# Patient Record
Sex: FEMALE | Race: WHITE | NOT HISPANIC OR LATINO | Employment: FULL TIME | URBAN - METROPOLITAN AREA
[De-identification: names, ages, dates, MRNs, and addresses within clinical notes are randomized per-mention and may not be internally consistent; named-entity substitution may affect disease eponyms.]

---

## 2017-06-06 ENCOUNTER — GENERIC CONVERSION - ENCOUNTER (OUTPATIENT)
Dept: OTHER | Facility: OTHER | Age: 57
End: 2017-06-06

## 2017-06-06 ENCOUNTER — ALLSCRIPTS OFFICE VISIT (OUTPATIENT)
Dept: OTHER | Facility: OTHER | Age: 57
End: 2017-06-06

## 2017-06-07 LAB
25(OH)D3 SERPL-MCNC: 17.8 NG/ML (ref 30–100)
A/G RATIO (HISTORICAL): 1.3 (ref 1.2–2.2)
ALBUMIN SERPL BCP-MCNC: 4.2 G/DL (ref 3.5–5.5)
ALP SERPL-CCNC: 74 IU/L (ref 39–117)
ALT SERPL W P-5'-P-CCNC: 12 IU/L (ref 0–32)
AST SERPL W P-5'-P-CCNC: 19 IU/L (ref 0–40)
BASOPHILS # BLD AUTO: 0.1 X10E3/UL (ref 0–0.2)
BASOPHILS # BLD AUTO: 1 %
BILIRUB SERPL-MCNC: 0.5 MG/DL (ref 0–1.2)
BUN SERPL-MCNC: 21 MG/DL (ref 6–24)
BUN/CREA RATIO (HISTORICAL): 17 (ref 9–23)
CALCIUM SERPL-MCNC: 9 MG/DL (ref 8.7–10.2)
CHLORIDE SERPL-SCNC: 100 MMOL/L (ref 96–106)
CHOLEST SERPL-MCNC: 240 MG/DL (ref 100–199)
CO2 SERPL-SCNC: 22 MMOL/L (ref 18–29)
CREAT SERPL-MCNC: 1.23 MG/DL (ref 0.57–1)
DEPRECATED RDW RBC AUTO: 14.7 % (ref 12.3–15.4)
EGFR AFRICAN AMERICAN (HISTORICAL): 56 ML/MIN/1.73
EGFR-AMERICAN CALC (HISTORICAL): 48 ML/MIN/1.73
EOSINOPHIL # BLD AUTO: 0.1 X10E3/UL (ref 0–0.4)
EOSINOPHIL # BLD AUTO: 3 %
GLUCOSE SERPL-MCNC: 95 MG/DL (ref 65–99)
HCT VFR BLD AUTO: 40.3 % (ref 34–46.6)
HDLC SERPL-MCNC: 50 MG/DL
HGB BLD-MCNC: 13.2 G/DL (ref 11.1–15.9)
IMM.GRANULOCYTES (CD4/8) (HISTORICAL): 0 %
IMM.GRANULOCYTES (CD4/8) (HISTORICAL): 0 X10E3/UL (ref 0–0.1)
LDLC SERPL CALC-MCNC: 161 MG/DL (ref 0–99)
LYMPHOCYTES # BLD AUTO: 1.1 X10E3/UL (ref 0.7–3.1)
LYMPHOCYTES # BLD AUTO: 27 %
MCH RBC QN AUTO: 29.4 PG (ref 26.6–33)
MCHC RBC AUTO-ENTMCNC: 32.8 G/DL (ref 31.5–35.7)
MCV RBC AUTO: 90 FL (ref 79–97)
MONOCYTES # BLD AUTO: 0.3 X10E3/UL (ref 0.1–0.9)
MONOCYTES (HISTORICAL): 8 %
NEUTROPHILS # BLD AUTO: 2.4 X10E3/UL (ref 1.4–7)
NEUTROPHILS # BLD AUTO: 61 %
PLATELET # BLD AUTO: 253 X10E3/UL (ref 150–379)
POTASSIUM SERPL-SCNC: 4.5 MMOL/L (ref 3.5–5.2)
RBC (HISTORICAL): 4.49 X10E6/UL (ref 3.77–5.28)
SODIUM SERPL-SCNC: 139 MMOL/L (ref 134–144)
TOT. GLOBULIN, SERUM (HISTORICAL): 3.2 G/DL (ref 1.5–4.5)
TOTAL PROTEIN (HISTORICAL): 7.4 G/DL (ref 6–8.5)
TRIGL SERPL-MCNC: 147 MG/DL (ref 0–149)
TSH SERPL DL<=0.05 MIU/L-ACNC: 2.87 UIU/ML (ref 0.45–4.5)
VLDLC SERPL CALC-MCNC: 29 MG/DL (ref 5–40)
WBC # BLD AUTO: 3.9 X10E3/UL (ref 3.4–10.8)

## 2017-06-08 LAB — HBA1C MFR BLD HPLC: 5.9 % (ref 4.8–5.6)

## 2017-06-12 ENCOUNTER — ALLSCRIPTS OFFICE VISIT (OUTPATIENT)
Dept: OTHER | Facility: OTHER | Age: 57
End: 2017-06-12

## 2017-06-12 DIAGNOSIS — Z12.39 ENCOUNTER FOR OTHER SCREENING FOR MALIGNANT NEOPLASM OF BREAST: ICD-10-CM

## 2017-12-12 ENCOUNTER — ALLSCRIPTS OFFICE VISIT (OUTPATIENT)
Dept: OTHER | Facility: OTHER | Age: 57
End: 2017-12-12

## 2017-12-13 LAB
A/G RATIO (HISTORICAL): 1.4 (ref 1.2–2.2)
ALBUMIN SERPL BCP-MCNC: 4.2 G/DL (ref 3.5–5.5)
ALP SERPL-CCNC: 71 IU/L (ref 39–117)
ALT SERPL W P-5'-P-CCNC: 10 IU/L (ref 0–32)
AST SERPL W P-5'-P-CCNC: 17 IU/L (ref 0–40)
BILIRUB SERPL-MCNC: 0.5 MG/DL (ref 0–1.2)
BUN SERPL-MCNC: 23 MG/DL (ref 6–24)
BUN/CREA RATIO (HISTORICAL): 19 (ref 9–23)
CALCIUM SERPL-MCNC: 9.2 MG/DL (ref 8.7–10.2)
CHLORIDE SERPL-SCNC: 98 MMOL/L (ref 96–106)
CHOLEST SERPL-MCNC: 226 MG/DL (ref 100–199)
CO2 SERPL-SCNC: 25 MMOL/L (ref 18–29)
CREAT SERPL-MCNC: 1.2 MG/DL (ref 0.57–1)
EGFR AFRICAN AMERICAN (HISTORICAL): 57 ML/MIN/1.73
EGFR-AMERICAN CALC (HISTORICAL): 50 ML/MIN/1.73
GLUCOSE SERPL-MCNC: 93 MG/DL (ref 65–99)
HDLC SERPL-MCNC: 49 MG/DL
LDL/HDL RATIO (HISTORICAL): 3.3 RATIO UNITS (ref 0–3.2)
LDLC SERPL CALC-MCNC: 160 MG/DL (ref 0–99)
POTASSIUM SERPL-SCNC: 4.2 MMOL/L (ref 3.5–5.2)
SODIUM SERPL-SCNC: 139 MMOL/L (ref 134–144)
TOT. GLOBULIN, SERUM (HISTORICAL): 3 G/DL (ref 1.5–4.5)
TOTAL PROTEIN (HISTORICAL): 7.2 G/DL (ref 6–8.5)
TRIGL SERPL-MCNC: 86 MG/DL (ref 0–149)
TSH SERPL DL<=0.05 MIU/L-ACNC: 2.84 UIU/ML (ref 0.45–4.5)
VLDLC SERPL CALC-MCNC: 17 MG/DL (ref 5–40)

## 2017-12-14 ENCOUNTER — GENERIC CONVERSION - ENCOUNTER (OUTPATIENT)
Dept: OTHER | Facility: OTHER | Age: 57
End: 2017-12-14

## 2017-12-14 LAB
EST. AVERAGE GLUCOSE BLD GHB EST-MCNC: 117 MG/DL
HBA1C MFR BLD HPLC: 5.7 %HB

## 2017-12-15 LAB — 25(OH)D3 SERPL-MCNC: 37 NG/ML

## 2017-12-19 ENCOUNTER — ALLSCRIPTS OFFICE VISIT (OUTPATIENT)
Dept: OTHER | Facility: OTHER | Age: 57
End: 2017-12-19

## 2017-12-20 NOTE — PROGRESS NOTES
Assessment  1  History of Benign essential hypertension (401 1) (I10)   2  History of type 2 diabetes mellitus (V12 29) (Z86 39)   3  History of familial combined hyperlipidemia (V12 29) (Z86 39)   4  Allergic rhinitis (477 9) (J30 9)   5  Hyperlipidemia (272 4) (E78 5)   6  Hypertension, unspecified type (401 9) (I10)   7  Elevated glucose (790 29) (R73 09)    Plan  Allergic rhinitis    · Fluticasone Propionate 50 MCG/ACT Nasal Suspension; USE 1 TO 2 SPRAYS INEACH NOSTRIL ONCE DAILY  PMH: Benign essential hypertension, Familial combined hyperlipidemia, PMH: History oftype 2 diabetes mellitus    · (1) COMPREHENSIVE METABOLIC PANEL; Status:Active; Requested JRB:[id number];    · (1) HEMOGLOBIN A1C; Status:Active; Requested [id number]:[id number];    · (1) LIPID PANEL FASTING W DIRECT LDL REFLEX; Status:Active; Requestedfor:01Jun2018;    · (1) TSH; Status:Active; Requested SBT:[id number]; Discussion/Summary    Hypertension stable well controlled on lisinopril hydrochlorothiazide  There is some concern that her dry cough may be ACE-inhibitor related  Patient will check with her insurance company  If it is covered we will consider switching her to losartan hydrochlorothiazide  Hyperlipidemia  Cholesterol has improved slightly but is still significantly above goal  Total cholesterol is 226 with an LDL of 160  Again recommended medication though patient declines at this time  She would like to continue to work on diet and her exercise program Reflux stable  On omeprazole patient gets good results  When she discontinues it for more than 2 days she gets return of significant reflux symptoms  Will continue at 20 mg Recheck in 6 months with labs prior to next appointment  Chief Complaint  Patient presents for a med check and to review BW results  Patient is here today for follow up of chronic conditions described in HPI        History of Present Illness  Patient returns for routine follow-up of chronic medical problems including hypertension hyperlipidemia reflux  She takes lisinopril hydrochlorothiazide for her blood pressure, omeprazole for her reflux  She is on no medication for her lipids presently  She has recently started a running program is working on her diet  She does complain of chronic postnasal drainage with a dry cough  Review of Systems   Constitutional: No fever, no chills, feels well, no tiredness, no recent weight gain or weight loss  Eyes: No complaints of eye pain, no red eyes, no eyesight problems, no discharge, no dry eyes, no itching of eyes  ENT: Postnasal drip  Cardiovascular: No complaints of slow heart rate, no fast heart rate, no chest pain, no palpitations, no leg claudication, no lower extremity edema  Respiratory: No complaints of shortness of breath, no wheezing, no cough, no SOB on exertion, no orthopnea, no PND  Gastrointestinal: No complaints of abdominal pain, no constipation, no nausea or vomiting, no diarrhea, no bloody stools  Genitourinary: No complaints of dysuria, no incontinence, no pelvic pain, no dysmenorrhea, no vaginal discharge or bleeding  Musculoskeletal: No complaints of arthralgias, no myalgias, no joint swelling or stiffness, no limb pain or swelling  Integumentary: No complaints of skin rash or lesions, no itching, no skin wounds, no breast pain or lump  Neurological: No complaints of headache, no confusion, no convulsions, no numbness, no dizziness or fainting, no tingling, no limb weakness, no difficulty walking  Psychiatric: Not suicidal, no sleep disturbance, no anxiety or depression, no change in personality, no emotional problems  Endocrine: No complaints of proptosis, no hot flashes, no muscle weakness, no deepening of the voice, no feelings of weakness  Hematologic/Lymphatic: No complaints of swollen glands, no swollen glands in the neck, does not bleed easily, does not bruise easily  Active Problems  1  Anemia (285 9) (D64 9)   2  Edema (782 3) (R60 9)   3  Esophageal reflux (530 81) (K21 9)   4  Vitamin D deficiency (268 9) (E55 9)    Past Medical History  1  History of Breast cancer screening (V76 10) (Z12 39)   2  History of Cervical cancer screening (V76 2) (Z12 4)   3  History of Colon cancer screening (V76 51) (Z12 11)   4  History of Encounter for screening colonoscopy (V76 51) (Z12 11)   5  History of Encounter for screening colonoscopy (V76 51) (Z12 11)   6  History of Encounter for screening mammogram for malignant neoplasm of breast (V76 12) (Z12 31)   7  History of acute bacterial sinusitis (V12 69) (Z87 09)   8  History of headache (V13 89) (Z87 898)   9  History of herpes simplex infection (V12 09) (Z86 19)   10  History of low back pain (V13 59) (Z87 39)   11  History of sebaceous cyst (V13 3) (Z87 2)   12  History of Joint pain (719 40) (M25 50)   13  History of Need for immunization against influenza (V04 81) (Z23)   14  History of Right knee pain (719 46) (M25 561)   15  History of Visit for screening mammogram (V76 12) (Z12 31)    The active problems and past medical history were reviewed and updated today  Family History  Mother    1  Family history of Allergic Drug Reaction  Son    2  Family history of Allergic Drug Reaction    Social History   · Being A Social Drinker   · Denied: History of Drug Use   · Never A Smoker    Current Meds   1  Calcium TABS; take 2 tablet daily; Therapy: (Recorded:38Nxv2815) to Recorded   2  Furosemide 20 MG Oral Tablet; Take 1 tablet daily; Therapy: 63WGT3982 to (Mendoza Zarco)  Requested for: 12Jun2017; Last Rx:12Jun2017 Ordered   3  Lisinopril-Hydrochlorothiazide 10-12 5 MG Oral Tablet; take 1 tablet by mouth once daily; Therapy: 83XGO6661 to (Farhan Gann)  Requested for: 96UGL2842; Last Rx:97Bmo4151 Ordered   4  Magnesium TABS; Take 1 tablet daily; Therapy: (Recorded:83Spm2670) to Recorded   5  Omeprazole 20 MG Oral Capsule Delayed Release; take 1 capsule daily;  Therapy: 08Eue7454 to (David Farooq)  Requested for: 12Jun2017; Last Rx:12Jun2017 Ordered   6  Vitamin D3 1000 UNIT Oral Capsule; take 1 capsule daily; Therapy: 74OIN4216 to Recorded    The medication list was reviewed and updated today  Allergies  1  No Known Drug Allergies    Vitals  Vital Signs    Recorded: 16CWP5798 09:26AM   Temperature 99 1 F, Tympanic   Heart Rate 87   Pulse Quality Normal   Systolic 769, LUE, Sitting   Diastolic 90, LUE, Sitting   BP CUFF SIZE Large   Height 5 ft 6 in   Weight 193 lb 3 oz   BMI Calculated 31 18   BSA Calculated 1 97   O2 Saturation 99, RA     Physical Exam   Pulmonary  Respiratory effort: No increased work of breathing or signs of respiratory distress  Auscultation of lungs: Clear to auscultation  Cardiovascular  Auscultation of heart: Normal rate and rhythm, normal S1 and S2, without murmurs     Examination of extremities for edema and/or varicosities: Normal    Musculoskeletal  Gait and station: Normal          Results/Data  General acute hospital) CMP14+eGFR 83MDH9810 09:48AM [place] Officer     Test Name Result Flag Reference   Glucose, Serum 93 mg/dL  65-99   BUN 23 mg/dL  6-24   Creatinine, Serum 1 20 mg/dL H 0 57-1 00   BUN/Creatinine Ratio 19  9-23   Sodium, Serum 139 mmol/L  134-144   Potassium, Serum 4 2 mmol/L  3 5-5 2   Chloride, Serum 98 mmol/L     Carbon Dioxide, Total 25 mmol/L  18-29   Calcium, Serum 9 2 mg/dL  8 7-10 2   Protein, Total, Serum 7 2 g/dL  6 0-8 5   Albumin, Serum 4 2 g/dL  3 5-5 5   Globulin, Total 3 0 g/dL  1 5-4 5   A/G Ratio 1 4  1 2-2 2   Bilirubin, Total 0 5 mg/dL  0 0-1 2   Alkaline Phosphatase, S 71 IU/L     AST (SGOT) 17 IU/L  0-40   ALT (SGPT) 10 IU/L  0-32   eGFR If NonAfricn Am 50 mL/min/1 73 L >59   eGFR If Africn Am 57 mL/min/1 73 L >59     (LC) Hemoglobin A1c 27Tlk4345 09:48AM [place] Officer     Test Name Result Flag Reference   Hemoglobin A1c 5 7 %Hb     Reference Range: American Diabetes Association (ADA) Guidelines: <5 7: Decreased risk for diabetes 5 7 - 6 4: Increased risk for diabetes >6 4: Ongoing Hyperglycemia of any cause <7 0: Glycemic control for adults with diabetes   Estimated Average Glucose 117 mg/dL       () Lipid Panel With LDL/HDL Ratio 12Dec2017 09:48AM Rodrigo Osorio     Test Name Result Flag Reference   Cholesterol, Total 226 mg/dL H 100-199   Triglycerides 86 mg/dL  0-149   HDL Cholesterol 49 mg/dL  >39   VLDL Cholesterol Jose 17 mg/dL  5-40   LDL Cholesterol Calc 160 mg/dL H 0-99   LDL/HDL Ratio 3 3 ratio units H 0 0-3 2   LDL/HDL Ratio                                                            Men  Women                                              1/2 Avg  Risk  1 0    1 5                                                  Avg Risk  3 6    3 2                                               2X Avg  Risk  6 2    5 0                                               3X Avg  Risk  8 0    6 1     () TSH Rfx on Abnormal to Free T4 12Dec2017 09:48AM Rodrigo Osorio     Test Name Result Flag Reference   TSH 2 840 uIU/mL  0 450-4 500     () Vitamin D, 25-Hydroxy, Total 12Dec2017 09:48AM Rodrigo Osorio     Test Name Result Flag Reference   Vitamin D, 25-Hydroxy, Serum 37 ng/mL     Reference Range: All Ages: Target levels 30 - 100     Health Management  History of Breast cancer screening   Digital Bilateral Screening Mammogram With CAD; every 1 year; Next Due: 26ROY6888; Overdue  History of Cervical cancer screening   (1) THIN PREP PAP WITH IMAGING; every 5 years; Next Due: 62LPK7321; Overdue  History of Colon cancer screening   COLONOSCOPY; every 10 years; Next Due: 64XOI9360; Overdue  History of Visit for screening mammogram   Digital Bilateral Screening Mammogram With CAD; every 1 year; Next Due: 82STO3369;  Overdue    Signatures   Electronically signed by : Aurelio De La Garza DO; Dec 19 2017 10:03AM EST                       (Author)

## 2018-01-12 VITALS
HEART RATE: 75 BPM | TEMPERATURE: 98.2 F | RESPIRATION RATE: 15 BRPM | HEIGHT: 66 IN | BODY MASS INDEX: 31.59 KG/M2 | DIASTOLIC BLOOD PRESSURE: 88 MMHG | OXYGEN SATURATION: 97 % | WEIGHT: 196.56 LBS | SYSTOLIC BLOOD PRESSURE: 128 MMHG

## 2018-01-18 NOTE — RESULT NOTES
Verified Results  (1) HEMOGLOBIN A1C 28Apr2016 04:09PM Juan Ayers   5 7-6 4% impaired fasting glucose  >=6 5% diagnosis of diabetes    Falsely low levels are seen in conditions linked to short RBC life span-  hemolytic anemia, and splenomegaly  Falsely elevated levels are seen in situations where there is an increased production of RBC- receipt of erythropoietin or blood transfusions  Adopted from ADA-Clinical Practice Recommendations     Test Name Result Flag Reference   HEMOGLOBIN A1C 5 5 %  4 0-5 6   EST  AVG   GLUCOSE 111 mg/dl

## 2018-01-23 VITALS
TEMPERATURE: 99.1 F | OXYGEN SATURATION: 99 % | BODY MASS INDEX: 31.05 KG/M2 | DIASTOLIC BLOOD PRESSURE: 90 MMHG | HEART RATE: 87 BPM | HEIGHT: 66 IN | SYSTOLIC BLOOD PRESSURE: 142 MMHG | WEIGHT: 193.19 LBS

## 2018-01-23 NOTE — RESULT NOTES
Verified Results  (LC) TSH Rfx on Abnormal to Free T4 40Iox7579 09:48AM Grady Kaur     Test Name Result Flag Reference   TSH 2 840 uIU/mL  0 450-4 500

## 2018-03-05 DIAGNOSIS — I10 ESSENTIAL HYPERTENSION: Primary | ICD-10-CM

## 2018-03-05 RX ORDER — LISINOPRIL AND HYDROCHLOROTHIAZIDE 12.5; 1 MG/1; MG/1
TABLET ORAL
Qty: 90 TABLET | Refills: 1 | Status: SHIPPED | OUTPATIENT
Start: 2018-03-05 | End: 2018-04-19 | Stop reason: SDUPTHER

## 2018-04-19 ENCOUNTER — TELEPHONE (OUTPATIENT)
Dept: FAMILY MEDICINE CLINIC | Facility: CLINIC | Age: 58
End: 2018-04-19

## 2018-04-19 DIAGNOSIS — I10 ESSENTIAL HYPERTENSION: ICD-10-CM

## 2018-04-19 RX ORDER — LISINOPRIL AND HYDROCHLOROTHIAZIDE 12.5; 1 MG/1; MG/1
1 TABLET ORAL DAILY
Qty: 90 TABLET | Refills: 0 | Status: SHIPPED | OUTPATIENT
Start: 2018-04-19 | End: 2018-06-19 | Stop reason: SDUPTHER

## 2018-06-12 ENCOUNTER — TELEPHONE (OUTPATIENT)
Dept: FAMILY MEDICINE CLINIC | Facility: CLINIC | Age: 58
End: 2018-06-12

## 2018-06-12 ENCOUNTER — CLINICAL SUPPORT (OUTPATIENT)
Dept: FAMILY MEDICINE CLINIC | Facility: CLINIC | Age: 58
End: 2018-06-12
Payer: COMMERCIAL

## 2018-06-12 DIAGNOSIS — R73.09 ELEVATED GLUCOSE: ICD-10-CM

## 2018-06-12 DIAGNOSIS — E78.2 MIXED HYPERLIPIDEMIA: ICD-10-CM

## 2018-06-12 DIAGNOSIS — E78.2 MIXED HYPERLIPIDEMIA: Primary | ICD-10-CM

## 2018-06-12 PROCEDURE — 80053 COMPREHEN METABOLIC PANEL: CPT | Performed by: FAMILY MEDICINE

## 2018-06-12 PROCEDURE — 80061 LIPID PANEL: CPT | Performed by: FAMILY MEDICINE

## 2018-06-12 PROCEDURE — 83036 HEMOGLOBIN GLYCOSYLATED A1C: CPT | Performed by: FAMILY MEDICINE

## 2018-06-12 PROCEDURE — 84443 ASSAY THYROID STIM HORMONE: CPT | Performed by: FAMILY MEDICINE

## 2018-06-12 PROCEDURE — 36415 COLL VENOUS BLD VENIPUNCTURE: CPT | Performed by: FAMILY MEDICINE

## 2018-06-13 LAB
ALBUMIN SERPL BCP-MCNC: 4.2 G/DL (ref 3.5–5)
ALP SERPL-CCNC: 80 U/L (ref 46–116)
ALT SERPL W P-5'-P-CCNC: 22 U/L (ref 12–78)
ANION GAP SERPL CALCULATED.3IONS-SCNC: 9 MMOL/L (ref 4–13)
AST SERPL W P-5'-P-CCNC: 17 U/L (ref 5–45)
BILIRUB SERPL-MCNC: 0.71 MG/DL (ref 0.2–1)
BUN SERPL-MCNC: 23 MG/DL (ref 5–25)
CALCIUM SERPL-MCNC: 9.2 MG/DL (ref 8.3–10.1)
CHLORIDE SERPL-SCNC: 102 MMOL/L (ref 100–108)
CHOLEST SERPL-MCNC: 207 MG/DL (ref 50–200)
CO2 SERPL-SCNC: 27 MMOL/L (ref 21–32)
CREAT SERPL-MCNC: 1.22 MG/DL (ref 0.6–1.3)
EST. AVERAGE GLUCOSE BLD GHB EST-MCNC: 126 MG/DL
GFR SERPL CREATININE-BSD FRML MDRD: 49 ML/MIN/1.73SQ M
GLUCOSE P FAST SERPL-MCNC: 92 MG/DL (ref 65–99)
HBA1C MFR BLD: 6 % (ref 4.2–6.3)
HDLC SERPL-MCNC: 49 MG/DL (ref 40–60)
LDLC SERPL CALC-MCNC: 125 MG/DL (ref 0–100)
POTASSIUM SERPL-SCNC: 4.4 MMOL/L (ref 3.5–5.3)
PROT SERPL-MCNC: 8 G/DL (ref 6.4–8.2)
SODIUM SERPL-SCNC: 138 MMOL/L (ref 136–145)
TRIGL SERPL-MCNC: 165 MG/DL
TSH SERPL DL<=0.05 MIU/L-ACNC: 2.36 UIU/ML (ref 0.36–3.74)

## 2018-06-18 RX ORDER — FLUTICASONE PROPIONATE 50 MCG
2 SPRAY, SUSPENSION (ML) NASAL DAILY
COMMUNITY
Start: 2017-12-19

## 2018-06-18 RX ORDER — BIOTIN 1 MG
1 TABLET ORAL DAILY
COMMUNITY
Start: 2017-12-19

## 2018-06-18 RX ORDER — FUROSEMIDE 20 MG/1
1 TABLET ORAL DAILY
COMMUNITY
Start: 2012-09-20 | End: 2018-06-19 | Stop reason: SDUPTHER

## 2018-06-18 RX ORDER — OMEPRAZOLE 20 MG/1
1 CAPSULE, DELAYED RELEASE ORAL DAILY
COMMUNITY
Start: 2011-02-24 | End: 2018-06-19 | Stop reason: SDUPTHER

## 2018-06-19 ENCOUNTER — OFFICE VISIT (OUTPATIENT)
Dept: FAMILY MEDICINE CLINIC | Facility: CLINIC | Age: 58
End: 2018-06-19
Payer: COMMERCIAL

## 2018-06-19 VITALS
SYSTOLIC BLOOD PRESSURE: 110 MMHG | OXYGEN SATURATION: 97 % | DIASTOLIC BLOOD PRESSURE: 70 MMHG | WEIGHT: 197.7 LBS | BODY MASS INDEX: 31.77 KG/M2 | TEMPERATURE: 97.8 F | RESPIRATION RATE: 17 BRPM | HEART RATE: 66 BPM | HEIGHT: 66 IN

## 2018-06-19 DIAGNOSIS — Z12.39 SCREENING FOR BREAST CANCER: ICD-10-CM

## 2018-06-19 DIAGNOSIS — Z12.11 SCREENING FOR COLON CANCER: Primary | ICD-10-CM

## 2018-06-19 DIAGNOSIS — I10 ESSENTIAL HYPERTENSION: ICD-10-CM

## 2018-06-19 DIAGNOSIS — K21.9 GASTROESOPHAGEAL REFLUX DISEASE WITHOUT ESOPHAGITIS: ICD-10-CM

## 2018-06-19 PROBLEM — J30.9 ALLERGIC RHINITIS: Status: ACTIVE | Noted: 2017-12-19

## 2018-06-19 PROCEDURE — 3074F SYST BP LT 130 MM HG: CPT | Performed by: FAMILY MEDICINE

## 2018-06-19 PROCEDURE — 3078F DIAST BP <80 MM HG: CPT | Performed by: FAMILY MEDICINE

## 2018-06-19 PROCEDURE — 99214 OFFICE O/P EST MOD 30 MIN: CPT | Performed by: FAMILY MEDICINE

## 2018-06-19 PROCEDURE — 1036F TOBACCO NON-USER: CPT | Performed by: FAMILY MEDICINE

## 2018-06-19 RX ORDER — FUROSEMIDE 20 MG/1
20 TABLET ORAL DAILY
Qty: 30 TABLET | Refills: 1 | Status: SHIPPED | OUTPATIENT
Start: 2018-06-19 | End: 2019-02-05 | Stop reason: SDUPTHER

## 2018-06-19 RX ORDER — OMEPRAZOLE 20 MG/1
20 CAPSULE, DELAYED RELEASE ORAL DAILY
Qty: 90 CAPSULE | Refills: 1 | Status: SHIPPED | OUTPATIENT
Start: 2018-06-19 | End: 2018-12-18 | Stop reason: SDUPTHER

## 2018-06-19 RX ORDER — LISINOPRIL AND HYDROCHLOROTHIAZIDE 12.5; 1 MG/1; MG/1
1 TABLET ORAL DAILY
Qty: 90 TABLET | Refills: 1 | Status: SHIPPED | OUTPATIENT
Start: 2018-06-19 | End: 2018-12-18 | Stop reason: SDUPTHER

## 2018-06-19 NOTE — ASSESSMENT & PLAN NOTE
Patient has improved significantly over the last year from the total cholesterol greater than 240 to a total cholesterol of 207 presently  Her HDL is still only 49 but her LDL is now less than 130  Continue with diet and exercise

## 2018-06-19 NOTE — ASSESSMENT & PLAN NOTE
Very well controlled on lisinopril hydrochlorothiazide  Some concern about possible Ace related cough  Symptoms relatively mild    Patient will call if it is been up to consider changing medication

## 2018-06-19 NOTE — ASSESSMENT & PLAN NOTE
Lab Results   Component Value Date    HGBA1C 6 0 06/12/2018       No results for input(s): POCGLU in the last 72 hours  Hemoglobin A1c 6 0    Continue with diet and exercise program

## 2018-06-19 NOTE — PROGRESS NOTES
Assessment/Plan:    DM[facility], type 2) [facility]  Lab Results   Component Value Date    HGBA1C 6 0 06/12/2018       No results for input(s): POCGLU in the last 72 hours  Hemoglobin A1c 6 0  Continue with diet and exercise program       Esophageal reflux    Stable on omeprazole  Continue current dosage    Benign essential hypertension   Very well controlled on lisinopril hydrochlorothiazide  Some concern about possible Ace related cough  Symptoms relatively mild  Patient will call if it is been up to consider changing medication    Familial combined hyperlipidemia   Patient has improved significantly over the last year from the total cholesterol greater than 240 to a total cholesterol of 207 presently  Her HDL is still only 49 but her LDL is now less than 130  Continue with diet and exercise  Diagnoses and all orders for this visit:    Screening for colon cancer  -     Ambulatory referral to Gastroenterology; Future    Screening for breast cancer  -     Mammo screening bilateral w cad; Future    Essential hypertension  -     lisinopril-hydrochlorothiazide (PRINZIDE,ZESTORETIC) 10-12 5 MG per tablet; Take 1 tablet by mouth daily  -     furosemide (LASIX) 20 mg tablet; Take 1 tablet (20 mg total) by mouth daily    Gastroesophageal reflux disease without esophagitis  -     omeprazole (PriLOSEC) 20 mg delayed release capsule; Take 1 capsule (20 mg total) by mouth daily    Other orders  -     fluticasone (FLONASE) 50 mcg/act nasal spray; 2 sprays into each nostril daily  -     CALCIUM PO; Take 2 tablets by mouth daily  -     Discontinue: furosemide (LASIX) 20 mg tablet; Take 1 tablet by mouth daily  -     Discontinue: omeprazole (PriLOSEC) 20 mg delayed release capsule; Take 1 capsule by mouth daily  -     Cholecalciferol (VITAMIN D3) 1000 units CAPS; Take 1 capsule by mouth daily          Subjective:      Patient ID: Deana Singh is a 59 y o  female      Patient presents for routine follow-up of chronic medical problems  She is being followed for borderline diabetes, hypertension, hyperlipidemia and reflux  She takes lisinopril hydrochlorothiazide for her blood pressure and furosemide on occasion for swelling  She takes omeprazole for reflux  She is on no medication for her blood sugar or for her lipids  She feels well  She has been exercising on a regular basis and trying to watch her diet  The following portions of the patient's history were reviewed and updated as appropriate: allergies, current medications, past family history, past medical history, past social history, past surgical history and problem list     Review of Systems   Constitutional: Negative  Respiratory: Negative  Cardiovascular: Negative  Gastrointestinal: Negative  Genitourinary: Negative  Musculoskeletal: Negative  Psychiatric/Behavioral: Negative  Objective:      /70 (BP Location: Left arm, Patient Position: Sitting)   Pulse 66   Temp 97 8 °F (36 6 °C) (Tympanic)   Resp 17   Ht 5' 5 5" (1 664 m)   Wt 89 7 kg (197 lb 11 2 oz)   SpO2 97%   BMI 32 40 kg/m²          Physical Exam   Constitutional: She is oriented to person, place, and time  She appears well-developed and well-nourished  No distress  HENT:   Head: Normocephalic and atraumatic  Eyes: Conjunctivae are normal  Pupils are equal, round, and reactive to light  Right eye exhibits no discharge  Neck: Normal range of motion  No thyromegaly present  Cardiovascular: Normal rate and regular rhythm  Pulmonary/Chest: Effort normal and breath sounds normal  No respiratory distress  Lymphadenopathy:     She has no cervical adenopathy  Neurological: She is alert and oriented to person, place, and time  Skin: Skin is warm and dry  She is not diaphoretic  Psychiatric: She has a normal mood and affect  Her behavior is normal  Judgment and thought content normal    Nursing note and vitals reviewed

## 2018-12-12 DIAGNOSIS — E11.9 TYPE 2 DIABETES MELLITUS WITHOUT COMPLICATION, WITHOUT LONG-TERM CURRENT USE OF INSULIN (HCC): Primary | ICD-10-CM

## 2018-12-12 DIAGNOSIS — E78.49 FAMILIAL COMBINED HYPERLIPIDEMIA: ICD-10-CM

## 2018-12-12 DIAGNOSIS — D64.9 ANEMIA, UNSPECIFIED TYPE: ICD-10-CM

## 2018-12-12 DIAGNOSIS — I10 BENIGN ESSENTIAL HYPERTENSION: ICD-10-CM

## 2018-12-13 ENCOUNTER — CLINICAL SUPPORT (OUTPATIENT)
Dept: FAMILY MEDICINE CLINIC | Facility: CLINIC | Age: 58
End: 2018-12-13
Payer: COMMERCIAL

## 2018-12-13 DIAGNOSIS — D64.9 ANEMIA, UNSPECIFIED TYPE: ICD-10-CM

## 2018-12-13 DIAGNOSIS — E11.9 TYPE 2 DIABETES MELLITUS WITHOUT COMPLICATION, WITHOUT LONG-TERM CURRENT USE OF INSULIN (HCC): ICD-10-CM

## 2018-12-13 DIAGNOSIS — E78.49 FAMILIAL COMBINED HYPERLIPIDEMIA: ICD-10-CM

## 2018-12-13 PROCEDURE — 36415 COLL VENOUS BLD VENIPUNCTURE: CPT | Performed by: FAMILY MEDICINE

## 2018-12-15 LAB
ALBUMIN SERPL-MCNC: 4.5 G/DL (ref 3.6–4.8)
ALBUMIN/GLOB SERPL: 1.4 {RATIO} (ref 1.2–2.2)
ALP SERPL-CCNC: 63 IU/L (ref 39–117)
ALT SERPL-CCNC: 19 IU/L (ref 0–32)
AST SERPL-CCNC: 33 IU/L (ref 0–40)
BASOPHILS # BLD AUTO: 0 X10E3/UL (ref 0–0.2)
BASOPHILS NFR BLD AUTO: 1 %
BILIRUB SERPL-MCNC: 0.6 MG/DL (ref 0–1.2)
BUN SERPL-MCNC: 27 MG/DL (ref 8–27)
BUN/CREAT SERPL: 21 (ref 12–28)
CALCIUM SERPL-MCNC: 9.8 MG/DL (ref 8.7–10.3)
CHLORIDE SERPL-SCNC: 98 MMOL/L (ref 96–106)
CHOLEST SERPL-MCNC: 234 MG/DL (ref 100–199)
CO2 SERPL-SCNC: 20 MMOL/L (ref 20–29)
CREAT SERPL-MCNC: 1.26 MG/DL (ref 0.57–1)
EOSINOPHIL # BLD AUTO: 0.2 X10E3/UL (ref 0–0.4)
EOSINOPHIL NFR BLD AUTO: 4 %
ERYTHROCYTE [DISTWIDTH] IN BLOOD BY AUTOMATED COUNT: 14.2 % (ref 12.3–15.4)
EST. AVERAGE GLUCOSE BLD GHB EST-MCNC: 117 MG/DL
GLOBULIN SER-MCNC: 3.3 G/DL (ref 1.5–4.5)
GLUCOSE SERPL-MCNC: 68 MG/DL (ref 65–99)
HBA1C MFR BLD: 5.7 % (ref 4.8–5.6)
HCT VFR BLD AUTO: 38.9 % (ref 34–46.6)
HDLC SERPL-MCNC: 56 MG/DL
HGB BLD-MCNC: 13.3 G/DL (ref 11.1–15.9)
IMM GRANULOCYTES # BLD: 0 X10E3/UL (ref 0–0.1)
IMM GRANULOCYTES NFR BLD: 0 %
LDLC SERPL CALC-MCNC: 166 MG/DL (ref 0–99)
LDLC/HDLC SERPL: 3 RATIO (ref 0–3.2)
LYMPHOCYTES # BLD AUTO: 1.4 X10E3/UL (ref 0.7–3.1)
LYMPHOCYTES NFR BLD AUTO: 28 %
MCH RBC QN AUTO: 30.8 PG (ref 26.6–33)
MCHC RBC AUTO-ENTMCNC: 34.2 G/DL (ref 31.5–35.7)
MCV RBC AUTO: 90 FL (ref 79–97)
MONOCYTES # BLD AUTO: 0.4 X10E3/UL (ref 0.1–0.9)
MONOCYTES NFR BLD AUTO: 9 %
NEUTROPHILS # BLD AUTO: 2.9 X10E3/UL (ref 1.4–7)
NEUTROPHILS NFR BLD AUTO: 58 %
PLATELET # BLD AUTO: 304 X10E3/UL (ref 150–379)
POTASSIUM SERPL-SCNC: 4.7 MMOL/L (ref 3.5–5.2)
PROT SERPL-MCNC: 7.8 G/DL (ref 6–8.5)
RBC # BLD AUTO: 4.32 X10E6/UL (ref 3.77–5.28)
SL AMB EGFR AFRICAN AMERICAN: 54 ML/MIN/1.73
SL AMB EGFR NON AFRICAN AMERICAN: 46 ML/MIN/1.73
SL AMB VLDL CHOLESTEROL CALC: 12 MG/DL (ref 5–40)
SODIUM SERPL-SCNC: 137 MMOL/L (ref 134–144)
TRIGL SERPL-MCNC: 60 MG/DL (ref 0–149)
TSH SERPL DL<=0.005 MIU/L-ACNC: 2.59 UIU/ML (ref 0.45–4.5)
WBC # BLD AUTO: 5 X10E3/UL (ref 3.4–10.8)

## 2018-12-18 DIAGNOSIS — K21.9 GASTROESOPHAGEAL REFLUX DISEASE WITHOUT ESOPHAGITIS: ICD-10-CM

## 2018-12-18 DIAGNOSIS — I10 ESSENTIAL HYPERTENSION: ICD-10-CM

## 2018-12-18 RX ORDER — OMEPRAZOLE 20 MG/1
CAPSULE, DELAYED RELEASE ORAL
Qty: 90 CAPSULE | Refills: 1 | Status: SHIPPED | OUTPATIENT
Start: 2018-12-18 | End: 2019-06-27 | Stop reason: SDUPTHER

## 2018-12-18 RX ORDER — LISINOPRIL AND HYDROCHLOROTHIAZIDE 12.5; 1 MG/1; MG/1
1 TABLET ORAL DAILY
Qty: 90 TABLET | Refills: 1 | Status: SHIPPED | OUTPATIENT
Start: 2018-12-18 | End: 2019-06-27 | Stop reason: SDUPTHER

## 2018-12-20 ENCOUNTER — OFFICE VISIT (OUTPATIENT)
Dept: FAMILY MEDICINE CLINIC | Facility: CLINIC | Age: 58
End: 2018-12-20
Payer: COMMERCIAL

## 2018-12-20 VITALS
WEIGHT: 204 LBS | SYSTOLIC BLOOD PRESSURE: 120 MMHG | HEIGHT: 66 IN | BODY MASS INDEX: 32.78 KG/M2 | OXYGEN SATURATION: 98 % | DIASTOLIC BLOOD PRESSURE: 84 MMHG | RESPIRATION RATE: 16 BRPM | TEMPERATURE: 98.8 F | HEART RATE: 89 BPM

## 2018-12-20 DIAGNOSIS — I10 BENIGN ESSENTIAL HYPERTENSION: Primary | ICD-10-CM

## 2018-12-20 DIAGNOSIS — E78.49 FAMILIAL COMBINED HYPERLIPIDEMIA: ICD-10-CM

## 2018-12-20 DIAGNOSIS — Z12.11 SCREEN FOR COLON CANCER: ICD-10-CM

## 2018-12-20 DIAGNOSIS — R73.01 ELEVATED FASTING GLUCOSE: ICD-10-CM

## 2018-12-20 PROCEDURE — 1036F TOBACCO NON-USER: CPT | Performed by: FAMILY MEDICINE

## 2018-12-20 PROCEDURE — 99214 OFFICE O/P EST MOD 30 MIN: CPT | Performed by: FAMILY MEDICINE

## 2018-12-20 PROCEDURE — 3074F SYST BP LT 130 MM HG: CPT | Performed by: FAMILY MEDICINE

## 2018-12-20 PROCEDURE — 3079F DIAST BP 80-89 MM HG: CPT | Performed by: FAMILY MEDICINE

## 2018-12-20 PROCEDURE — 3008F BODY MASS INDEX DOCD: CPT | Performed by: FAMILY MEDICINE

## 2018-12-20 NOTE — PROGRESS NOTES
Assessment/Plan:    Hyperlipidemia   cholesterol has risen from 207-234  HDL of 56 and LDL of 166  Reviewed need for diet and exercise with patient  She declines statin medication  Hypertension   stable on lisinopril hydrochlorothiazide but potentially having some dry cough related to her Ace inhibitor  Discussed switching her to an ARB such as losartan  She will check with her insurance company  Reflux   well controlled on proton pump inhibitor but she takes intermittently  Will continue to monitor  Reviewed need for colonoscopy  Patient declines colon screening at this time  Mammogram up-to-date  Declines flu vaccine     recheck in 6 months with labs prior to next visit       Diagnoses and all orders for this visit:    Benign essential hypertension    Familial combined hyperlipidemia  -     Comprehensive metabolic panel; Future  -     Lipid Panel with Direct LDL reflex; Future  -     TSH, 3rd generation; Future  -     Comprehensive metabolic panel  -     Lipid Panel with Direct LDL reflex  -     TSH, 3rd generation    Screen for colon cancer  -     Cancel: Occult Blood, Fecal, IA; Future    Elevated fasting glucose  -     Hemoglobin A1C; Future  -     Hemoglobin A1C          Subjective:      Patient ID: Gertrudis Bustillos is a 59 y o  female  Patient was seen for routine follow-up chronic medical problems and review recent blood work  She is being treated for high blood pressure with lisinopril hydrochlorothiazide  She has a slight dry cough which she attributes to the lisinopril  She was previously on Benicar but that was discontinued due to formulary issues with her insurance  She takes omeprazole on and off for reflux symptoms  She also today complains of some chronic right shoulder pain and intermittent low back pain  These are not related to any specific injury or trauma          The following portions of the patient's history were reviewed and updated as appropriate: allergies, current medications, past family history, past medical history, past social history, past surgical history and problem list     Review of Systems   Constitutional: Negative  Respiratory: Negative  Cardiovascular: Negative  Gastrointestinal: Negative  Genitourinary: Negative  Musculoskeletal: Positive for arthralgias (  Right shoulder) and back pain  Psychiatric/Behavioral: Negative  Objective:      /84 (BP Location: Left arm, Patient Position: Sitting, Cuff Size: Large)   Pulse 89   Temp 98 8 °F (37 1 °C) (Tympanic)   Resp 16   Ht 5' 5 75" (1 67 m)   Wt 92 5 kg (204 lb)   SpO2 98%   BMI 33 18 kg/m²          Physical Exam   Constitutional: She is oriented to person, place, and time  She appears well-developed and well-nourished  No distress  HENT:   Head: Normocephalic and atraumatic  Eyes: Pupils are equal, round, and reactive to light  Conjunctivae are normal  Right eye exhibits no discharge  Neck: Normal range of motion  No thyromegaly present  Cardiovascular: Normal rate and regular rhythm  Pulmonary/Chest: Effort normal and breath sounds normal  No respiratory distress  Musculoskeletal:    Mildly decreased right shoulder range of motion  Lymphadenopathy:     She has no cervical adenopathy  Neurological: She is alert and oriented to person, place, and time  Skin: Skin is warm and dry  She is not diaphoretic  Psychiatric: She has a normal mood and affect  Her behavior is normal  Judgment and thought content normal    Nursing note and vitals reviewed

## 2019-01-22 ENCOUNTER — TELEPHONE (OUTPATIENT)
Dept: FAMILY MEDICINE CLINIC | Facility: CLINIC | Age: 59
End: 2019-01-22

## 2019-01-22 NOTE — TELEPHONE ENCOUNTER
Pt fell today and went to ER she would like a call from you  She has a question about wound care and her stitches   Please call [phone]

## 2019-01-27 ENCOUNTER — OFFICE VISIT (OUTPATIENT)
Dept: FAMILY MEDICINE CLINIC | Facility: CLINIC | Age: 59
End: 2019-01-27
Payer: COMMERCIAL

## 2019-01-27 VITALS
BODY MASS INDEX: 33.27 KG/M2 | OXYGEN SATURATION: 95 % | HEART RATE: 90 BPM | WEIGHT: 207 LBS | SYSTOLIC BLOOD PRESSURE: 128 MMHG | DIASTOLIC BLOOD PRESSURE: 90 MMHG | TEMPERATURE: 97.7 F | RESPIRATION RATE: 17 BRPM | HEIGHT: 66 IN

## 2019-01-27 DIAGNOSIS — I10 ESSENTIAL HYPERTENSION: Primary | ICD-10-CM

## 2019-01-27 DIAGNOSIS — S81.012A KNEE LACERATION, LEFT, INITIAL ENCOUNTER: ICD-10-CM
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
## 2019-01-27 PROCEDURE — 99213 OFFICE O/P EST LOW 20 MIN: CPT | Performed by: FAMILY MEDICINE

## 2019-01-27 RX ORDER — LISINOPRIL AND HYDROCHLOROTHIAZIDE 20; 12.5 MG/1; MG/1
1 TABLET ORAL DAILY
COMMUNITY
End: 2019-01-27 | Stop reason: SDUPTHER

## 2019-01-27 RX ORDER — OMEPRAZOLE 20 MG/1
20 CAPSULE, DELAYED RELEASE ORAL DAILY PRN
COMMUNITY
End: 2019-01-27 | Stop reason: SDUPTHER

## 2019-01-27 RX ORDER — LISINOPRIL AND HYDROCHLOROTHIAZIDE 20; 12.5 MG/1; MG/1
1 TABLET ORAL DAILY
Qty: 90 TABLET | Refills: 0 | Status: SHIPPED | OUTPATIENT
Start: 2019-01-27 | End: 2019-09-20 | Stop reason: ALTCHOICE

## 2019-01-27 RX ORDER — DIPHENOXYLATE HYDROCHLORIDE AND ATROPINE SULFATE 2.5; .025 MG/1; MG/1
1 TABLET ORAL DAILY
COMMUNITY

## 2019-01-27 RX ORDER — CHLORAL HYDRATE 500 MG
1000 CAPSULE ORAL DAILY
COMMUNITY

## 2019-01-27 NOTE — PROGRESS NOTES
Assessment/Plan:   1  Knee laceration, left, initial encounter  Patient's symptoms appear stable today  At this time, her wound still appears hearing dot there does not appear to be any sign of wound infection  She was advised to monitor for any signs of erythema, fevers, warmth  She may continue with antibiotic ointment  Will follow up over the weekend for suture removal     2  Essential hypertension  - lisinopril-hydrochlorothiazide (PRINZIDE,ZESTORETIC) 20-12 5 MG per tablet; Take 1 tablet by mouth daily  Dispense: 90 tablet; Refill: 0    3  Fall down stairs, initial encounter     Diagnoses and all orders for this visit:    Essential hypertension  -     lisinopril-hydrochlorothiazide (PRINZIDE,ZESTORETIC) 20-12 5 MG per tablet; Take 1 tablet by mouth daily    Other orders  -     multivitamin (THERAGRAN) TABS; Take 1 tablet by mouth daily  -     Omega-3 Fatty Acids (FISH OIL) 1,000 mg; Take 1,000 mg by mouth daily  -     Discontinue: omeprazole (PriLOSEC) 20 mg delayed release capsule; Take 20 mg by mouth daily as needed  -     lisinopril-hydrochlorothiazide (PRINZIDE,ZESTORETIC) 20-12 5 MG per tablet; Take 1 tablet by mouth daily          Subjective:    Chief Complaint   Patient presents with    Follow-up     ER L Leg Laceration 1/22/19 @ [facility]        Patient ID: Mary Ann Hawk is a 59 y o  female  Patient is a 23-year-old female presents today for an ER follow-up  She states that she was in ER secondary to a left knee laceration  She states that she was at home in her basement steps  She made a turn to go back down and she lost her footing and slipped  She did fall down approximately 9 stairs  She does she did sustain a linear laceration over her knee  She was taken to the ED  She did have sutures placed  She has been complying with proper wound care  She still does have mild pain in this area  She has been keeping it covered          Review of Systems   Constitutional: Negative for activity change, chills, fatigue and fever  HENT: Negative for congestion, ear pain, sinus pressure and sore throat  Eyes: Negative for redness, itching and visual disturbance  Respiratory: Negative for cough and shortness of breath  Cardiovascular: Negative for chest pain and palpitations  Gastrointestinal: Negative for abdominal pain, diarrhea and nausea  Endocrine: Negative for cold intolerance and heat intolerance  Genitourinary: Negative for dysuria, flank pain and frequency  Musculoskeletal: Negative for arthralgias, back pain, gait problem and myalgias  Skin: Negative for color change  Allergic/Immunologic: Negative for environmental allergies  Neurological: Negative for dizziness, numbness and headaches  Psychiatric/Behavioral: Negative for behavioral problems and sleep disturbance  The following portions of the patient's history were reviewed and updated as appropriate : past family history, past medical history, past social history and past surgical history        Current Outpatient Prescriptions:     CALCIUM PO, Take 2 tablets by mouth daily, Disp: , Rfl:     Cholecalciferol (VITAMIN D3) 1000 units CAPS, Take 1 capsule by mouth daily, Disp: , Rfl:     fluticasone (FLONASE) 50 mcg/act nasal spray, 2 sprays into each nostril daily, Disp: , Rfl:     furosemide (LASIX) 20 mg tablet, Take 1 tablet (20 mg total) by mouth daily, Disp: 30 tablet, Rfl: 1    lisinopril-hydrochlorothiazide (PRINZIDE,ZESTORETIC) 20-12 5 MG per tablet, Take 1 tablet by mouth daily, Disp: , Rfl:     multivitamin (THERAGRAN) TABS, Take 1 tablet by mouth daily, Disp: , Rfl:     Omega-3 Fatty Acids (FISH OIL) 1,000 mg, Take 1,000 mg by mouth daily, Disp: , Rfl:     omeprazole (PriLOSEC) 20 mg delayed release capsule, TAKE 1 CAPSULE BY MOUTH  DAILY, Disp: 90 capsule, Rfl: 1    lisinopril-hydrochlorothiazide (PRINZIDE,ZESTORETIC) 10-12 5 MG per tablet, TAKE 1 TABLET BY MOUTH  DAILY (Patient not taking: Reported on 1/27/2019 ), Disp: 90 tablet, Rfl: 1    Objective:    Vitals:    01/27/19 1110   BP: 128/90   BP Location: Left arm   Patient Position: Sitting   Cuff Size: Large   Pulse: 90   Resp: 17   Temp: 97 7 °F (36 5 °C)   TempSrc: Tympanic   SpO2: 95%   Weight: 93 9 kg (207 lb)   Height: 5' 5 75" (1 67 m)        Physical Exam   Constitutional: She is oriented to person, place, and time  She appears well-developed and well-nourished  HENT:   Head: Normocephalic and atraumatic  Nose: Nose normal    Mouth/Throat: No oropharyngeal exudate  Eyes: Pupils are equal, round, and reactive to light  Right eye exhibits no discharge  Left eye exhibits no discharge  Neck: Normal range of motion  Neck supple  No tracheal deviation present  Cardiovascular: Normal rate, regular rhythm and intact distal pulses  Exam reveals no gallop and no friction rub  No murmur heard  Pulses:       Dorsalis pedis pulses are 2+ on the right side, and 2+ on the left side  Posterior tibial pulses are 2+ on the right side, and 2+ on the left side  Pulmonary/Chest: Effort normal and breath sounds normal  No respiratory distress  She has no wheezes  She has no rales  Abdominal: Soft  Bowel sounds are normal  She exhibits no distension  There is no tenderness  There is no rebound and no guarding  Musculoskeletal: Normal range of motion  She exhibits no edema  Lymphadenopathy:        Head (right side): No submental and no submandibular adenopathy present  Head (left side): No submental and no submandibular adenopathy present  She has no cervical adenopathy  Right cervical: No superficial cervical, no deep cervical and no posterior cervical adenopathy present  Left cervical: No superficial cervical, no deep cervical and no posterior cervical adenopathy present  Neurological: She is alert and oriented to person, place, and time  No cranial nerve deficit or sensory deficit     Skin: Skin is warm, dry and intact  Psychiatric: Her speech is normal and behavior is normal  Judgment normal  Her mood appears not anxious  Cognition and memory are normal  She does not exhibit a depressed mood  Vitals reviewed

## 2019-02-02 ENCOUNTER — OFFICE VISIT (OUTPATIENT)
Dept: FAMILY MEDICINE CLINIC | Facility: CLINIC | Age: 59
End: 2019-02-02
Payer: COMMERCIAL

## 2019-02-02 VITALS
HEIGHT: 65 IN | BODY MASS INDEX: 34.37 KG/M2 | SYSTOLIC BLOOD PRESSURE: 130 MMHG | DIASTOLIC BLOOD PRESSURE: 92 MMHG | TEMPERATURE: 98.6 F | WEIGHT: 206.3 LBS
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
## 2019-02-02 DIAGNOSIS — S81.012D LACERATION OF LEFT KNEE, SUBSEQUENT ENCOUNTER: Primary | ICD-10-CM

## 2019-02-02 DIAGNOSIS — Z12.11 SCREENING FOR COLON CANCER: ICD-10-CM

## 2019-02-02 PROCEDURE — 99213 OFFICE O/P EST LOW 20 MIN: CPT | Performed by: PHYSICIAN ASSISTANT

## 2019-02-02 NOTE — PROGRESS NOTES
Assessment/Plan:      Diagnoses and all orders for this visit:    Laceration of left knee, subsequent encounter    Fall (on) (from) other stairs and steps, subsequent encounter    Screening for colon cancer  -     Ambulatory referral to Gastroenterology; Future    Other orders  -     Suture removal      patient is a 64-year-old female presenting today for suture removal of a laceration of her left knee  Procedure as described below, little difficult due to how deep the laceration was as well as how the sutures were placed and how the skin was folding over the sutures  They have been in place for 12 days  However I did take the time to thoroughly investigate the area and I did not see any further suture pieces leftl and she tolerated the procedure well  There was no dehiscence  The wound is clean with some surrounding pink border which I reassured patient appears to be new healing skin  Due to the folding nature of the skin she probably will have a raised scar  Wound care discussed and she is to keep the area completely dry for the next 3 days and I will have her return for recheck in 3 days for clearance to remove the bandage and evaluate 1 more time before allowing her to get the area wet and start using cocoa butter on it  She will call in the meantime with any concerns  She was advised to avoid excessively bending the knee which can certainly cause weakness in the skin where the laceration is  Chief Complaint   Patient presents with    Follow-up    Suture / Staple Removal     8 sutures in L knee x 11 days     Subjective:     Patient ID: Alvarado Diaz is a 59 y o  female  Patient is a 64-year-old female presenting today for suture removal of her left knee  On 01/22 she reportedly fell down about 9 steps in her home  She was seen at [facility] ER with sutures placed    She did have a checkup with Dr So Rodriguez for med check and sutures were reportedly in place and appearing healthy at that time on 01/27  States left knee near sutures looks like it is still open  She has been applying neosporin, rinsing it and bandage  Area still sore, pinching  No drainage  Review of Systems   Constitutional: Negative  Skin:        As in HPI         The following portions of the patient's history were reviewed and updated as appropriate: allergies, current medications, past family history, past medical history, past social history, past surgical history and problem list       Objective:     Physical Exam   Constitutional: She appears well-developed  No distress  Skin:   Left knee with large sutured laceration, slightly folded and lumpy from how skin was brought together for suturing  There is no odor, no drainage, no gaping of the wound  The surrounding area is still slightly edematous but no redness or warmth  5 visible knotted sutures noted along superior border of skin fold of laceration   Vitals reviewed  Vitals:    02/02/19 1108   BP: 130/92   BP Location: Left arm   Patient Position: Sitting   Temp: 98 6 °F (37 °C)   TempSrc: Tympanic   Weight: 93 6 kg (206 lb 4 8 oz)   Height: 5' 5" (1 651 m)     Suture removal  Date/Time: 2/2/2019 1:04 PM  Performed by: [facility]  Authorized by: [facility]     Patient location:  Clinic  Consent:     Consent obtained:  Verbal    Consent given by:  Patient    Risks discussed:  Bleeding, pain and wound separation    Alternatives discussed:  No treatment  Location:     Laterality:  Left    Location:  Lower extremity    Lower extremity location:  Knee    Knee location:  L knee  Procedure details: Tools used:  Suture removal kit    Wound appearance:  No sign(s) of infection    Number of sutures removed:  5  Post-procedure details:     Post-removal:  Antibiotic ointment applied, Steri-Strips applied and Band-Aid applied    Patient tolerance of procedure:   Tolerated well, no immediate complications  Comments:      Suture removal procedure was difficult, as some of the sutures were grown into skin and deeply covered under skin fold, as skin folded over from where it was brought together  Total time for procedure was about 25minutes, making sure all visible suture ends were removed  No dehiscence noted  Pt was worried but tolerated procedure well

## 2019-02-05 ENCOUNTER — OFFICE VISIT (OUTPATIENT)
Dept: FAMILY MEDICINE CLINIC | Facility: CLINIC | Age: 59
End: 2019-02-05
Payer: COMMERCIAL

## 2019-02-05 VITALS
HEART RATE: 74 BPM | OXYGEN SATURATION: 95 % | HEIGHT: 65 IN | WEIGHT: 206 LBS | TEMPERATURE: 98.1 F | BODY MASS INDEX: 34.32 KG/M2 | SYSTOLIC BLOOD PRESSURE: 118 MMHG | RESPIRATION RATE: 17 BRPM | DIASTOLIC BLOOD PRESSURE: 90 MMHG

## 2019-02-05 DIAGNOSIS — S81.012A LACERATION OF LEFT KNEE, INITIAL ENCOUNTER: Primary | ICD-10-CM

## 2019-02-05 DIAGNOSIS — I10 ESSENTIAL HYPERTENSION: ICD-10-CM
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
## 2019-02-05 PROCEDURE — 3008F BODY MASS INDEX DOCD: CPT | Performed by: FAMILY MEDICINE

## 2019-02-05 PROCEDURE — 99213 OFFICE O/P EST LOW 20 MIN: CPT | Performed by: FAMILY MEDICINE

## 2019-02-05 PROCEDURE — 1036F TOBACCO NON-USER: CPT | Performed by: FAMILY MEDICINE

## 2019-02-05 RX ORDER — FUROSEMIDE 20 MG/1
20 TABLET ORAL DAILY
Qty: 30 TABLET | Refills: 0 | Status: SHIPPED | OUTPATIENT
Start: 2019-02-05 | End: 2020-03-17 | Stop reason: SDUPTHER

## 2019-02-05 NOTE — PROGRESS NOTES
Assessment/Plan:   1  Laceration of left knee, initial encounter  Wound appears stable today  It appears clean dry and intact  At this time, patient was advised to continue with proper wound care  She was educated on the signs of possible wound infection  She was also advised on the importance increasing her range of motion as well as her activity  If she notes any abnormalities or any wound changes, she must call immediately  Patient will prefer to be seen again in 1 week for further evaluation  2  Fall down stairs, initial encounter    3  Essential hypertension  - furosemide (LASIX) 20 mg tablet; Take 1 tablet (20 mg total) by mouth daily  Dispense: 30 tablet; Refill: 0     Diagnoses and all orders for this visit:    Essential hypertension  -     furosemide (LASIX) 20 mg tablet; Take 1 tablet (20 mg total) by mouth daily          Subjective:    Chief Complaint   Patient presents with    Follow-up     pt here for her f/u to when she had the stiches taken out to make sure everything is ok        Patient ID: Gertrudis Bustillos is a 59 y o  female  Patient is a 53-year-old female presents today for a follow-up to evaluate her left knee wound  She states that she was seen over the weekend for suture removal   She states that she is concerned over her wound  She still has been trying to limit dot any range of motion in her knee due to this laceration  She still has been noticing mild discharge from this wound  She denies any fevers chills redness or pleural and discharge  She has been keeping this covered with a bandage  Review of Systems   Constitutional: Negative for activity change, chills, fatigue and fever  HENT: Negative for congestion, ear pain, sinus pressure and sore throat  Eyes: Negative for redness, itching and visual disturbance  Respiratory: Negative for cough and shortness of breath  Cardiovascular: Negative for chest pain and palpitations     Gastrointestinal: Negative for abdominal pain, diarrhea and nausea  Endocrine: Negative for cold intolerance and heat intolerance  Genitourinary: Negative for dysuria, flank pain and frequency  Musculoskeletal: Negative for arthralgias, back pain, gait problem and myalgias  Skin: Positive for wound  Negative for color change  Allergic/Immunologic: Negative for environmental allergies  Neurological: Negative for dizziness, numbness and headaches  Psychiatric/Behavioral: Negative for behavioral problems and sleep disturbance  The following portions of the patient's history were reviewed and updated as appropriate : past family history, past medical history, past social history and past surgical history        Current Outpatient Prescriptions:     CALCIUM PO, Take 2 tablets by mouth daily, Disp: , Rfl:     Cholecalciferol (VITAMIN D3) 1000 units CAPS, Take 1 capsule by mouth daily, Disp: , Rfl:     fluticasone (FLONASE) 50 mcg/act nasal spray, 2 sprays into each nostril daily, Disp: , Rfl:     furosemide (LASIX) 20 mg tablet, Take 1 tablet (20 mg total) by mouth daily, Disp: 30 tablet, Rfl: 0    lisinopril-hydrochlorothiazide (PRINZIDE,ZESTORETIC) 10-12 5 MG per tablet, TAKE 1 TABLET BY MOUTH  DAILY, Disp: 90 tablet, Rfl: 1    multivitamin (THERAGRAN) TABS, Take 1 tablet by mouth daily, Disp: , Rfl:     Omega-3 Fatty Acids (FISH OIL) 1,000 mg, Take 1,000 mg by mouth daily, Disp: , Rfl:     omeprazole (PriLOSEC) 20 mg delayed release capsule, TAKE 1 CAPSULE BY MOUTH  DAILY, Disp: 90 capsule, Rfl: 1    lisinopril-hydrochlorothiazide (PRINZIDE,ZESTORETIC) 20-12 5 MG per tablet, Take 1 tablet by mouth daily (Patient not taking: Reported on 2/2/2019 ), Disp: 90 tablet, Rfl: 0    Objective:    Vitals:    02/05/19 1330   BP: 118/90   BP Location: Left arm   Patient Position: Sitting   Cuff Size: Adult   Pulse: 74   Resp: 17   Temp: 98 1 °F (36 7 °C)   TempSrc: Tympanic   SpO2: 95%   Weight: 93 4 kg (206 lb)   Height: 5' 5" (1 651 m)        Physical Exam   Constitutional: Vital signs are normal  She appears well-developed and well-nourished  She is cooperative  She does not appear ill  No distress  HENT:   Head: Normocephalic and atraumatic  Right Ear: Hearing and external ear normal    Left Ear: Hearing and external ear normal    Nose: Nose normal  No nasal deformity or septal deviation  Mouth/Throat: Oropharynx is clear and moist and mucous membranes are normal    Eyes: Pupils are equal, round, and reactive to light  EOM and lids are normal    Neck: Trachea normal and normal range of motion  Neck supple  No edema and no erythema present  No thyromegaly present  Cardiovascular: Normal rate  Pulmonary/Chest: Effort normal  No respiratory distress  Abdominal: Normal appearance  There is no guarding  Musculoskeletal: Normal range of motion  Right shoulder: She exhibits no pain  Neurological: She is alert  She has normal strength  No cranial nerve deficit or sensory deficit  Skin: Skin is warm and dry  Psychiatric: She has a normal mood and affect   Her speech is normal and behavior is normal  Judgment and thought content normal  Cognition and memory are normal

## 2019-06-27 DIAGNOSIS — I10 ESSENTIAL HYPERTENSION: ICD-10-CM

## 2019-06-27 DIAGNOSIS — K21.9 GASTROESOPHAGEAL REFLUX DISEASE WITHOUT ESOPHAGITIS: ICD-10-CM

## 2019-06-28 RX ORDER — OMEPRAZOLE 20 MG/1
CAPSULE, DELAYED RELEASE ORAL
Qty: 90 CAPSULE | Refills: 0 | Status: SHIPPED | OUTPATIENT
Start: 2019-06-28 | End: 2019-09-20 | Stop reason: SDUPTHER

## 2019-06-28 RX ORDER — LISINOPRIL AND HYDROCHLOROTHIAZIDE 12.5; 1 MG/1; MG/1
1 TABLET ORAL DAILY
Qty: 90 TABLET | Refills: 0 | Status: SHIPPED | OUTPATIENT
Start: 2019-06-28 | End: 2019-08-07 | Stop reason: SDUPTHER

## 2019-08-06 DIAGNOSIS — I10 ESSENTIAL HYPERTENSION: ICD-10-CM

## 2019-08-06 NOTE — TELEPHONE ENCOUNTER
Kavya has an appt for a 6 month ck on 9 20 19  She only has 3 Lisinopril left    Please call in rx for: Lisinopril-hydrochlorthyazide 10-12 5 tablet, 1 tablet daily  #90    To [facility] [place]  thanks

## 2019-08-07 RX ORDER — LISINOPRIL AND HYDROCHLOROTHIAZIDE 12.5; 1 MG/1; MG/1
1 TABLET ORAL DAILY
Qty: 30 TABLET | Refills: 0 | Status: SHIPPED | OUTPATIENT
Start: 2019-08-07 | End: 2019-09-12 | Stop reason: SDUPTHER

## 2019-09-12 DIAGNOSIS — I10 ESSENTIAL HYPERTENSION: ICD-10-CM

## 2019-09-12 RX ORDER — LISINOPRIL AND HYDROCHLOROTHIAZIDE 12.5; 1 MG/1; MG/1
1 TABLET ORAL DAILY
Qty: 30 TABLET | Refills: 0 | Status: SHIPPED | OUTPATIENT
Start: 2019-09-12 | End: 2019-09-20 | Stop reason: SDUPTHER

## 2019-09-13 ENCOUNTER — TRANSCRIBE ORDERS (OUTPATIENT)
Dept: LAB | Facility: CLINIC | Age: 59
End: 2019-09-13

## 2019-09-13 ENCOUNTER — APPOINTMENT (OUTPATIENT)
Dept: LAB | Facility: CLINIC | Age: 59
End: 2019-09-13
Payer: COMMERCIAL

## 2019-09-13 DIAGNOSIS — R73.01 IMPAIRED FASTING GLUCOSE: ICD-10-CM

## 2019-09-13 DIAGNOSIS — E78.49 FAMILIAL COMBINED HYPERLIPIDEMIA: ICD-10-CM

## 2019-09-13 DIAGNOSIS — R73.01 IMPAIRED FASTING GLUCOSE: Primary | ICD-10-CM

## 2019-09-13 LAB
ALBUMIN SERPL BCP-MCNC: 3.6 G/DL (ref 3.5–5)
ALP SERPL-CCNC: 76 U/L (ref 46–116)
ALT SERPL W P-5'-P-CCNC: 24 U/L (ref 12–78)
ANION GAP SERPL CALCULATED.3IONS-SCNC: 2 MMOL/L (ref 4–13)
AST SERPL W P-5'-P-CCNC: 19 U/L (ref 5–45)
BILIRUB SERPL-MCNC: 0.47 MG/DL (ref 0.2–1)
BUN SERPL-MCNC: 26 MG/DL (ref 5–25)
CALCIUM SERPL-MCNC: 9.1 MG/DL (ref 8.3–10.1)
CHLORIDE SERPL-SCNC: 103 MMOL/L (ref 100–108)
CHOLEST SERPL-MCNC: 231 MG/DL (ref 50–200)
CO2 SERPL-SCNC: 29 MMOL/L (ref 21–32)
CREAT SERPL-MCNC: 1.23 MG/DL (ref 0.6–1.3)
EST. AVERAGE GLUCOSE BLD GHB EST-MCNC: 117 MG/DL
GFR SERPL CREATININE-BSD FRML MDRD: 47 ML/MIN/1.73SQ M
GLUCOSE P FAST SERPL-MCNC: 96 MG/DL (ref 65–99)
HBA1C MFR BLD: 5.7 % (ref 4.2–6.3)
HDLC SERPL-MCNC: 53 MG/DL (ref 40–60)
LDLC SERPL CALC-MCNC: 154 MG/DL (ref 0–100)
POTASSIUM SERPL-SCNC: 4 MMOL/L (ref 3.5–5.3)
PROT SERPL-MCNC: 8.1 G/DL (ref 6.4–8.2)
SODIUM SERPL-SCNC: 134 MMOL/L (ref 136–145)
TRIGL SERPL-MCNC: 119 MG/DL
TSH SERPL DL<=0.05 MIU/L-ACNC: 1.77 UIU/ML (ref 0.36–3.74)

## 2019-09-13 PROCEDURE — 36415 COLL VENOUS BLD VENIPUNCTURE: CPT

## 2019-09-13 PROCEDURE — 83036 HEMOGLOBIN GLYCOSYLATED A1C: CPT

## 2019-09-13 PROCEDURE — 84443 ASSAY THYROID STIM HORMONE: CPT

## 2019-09-13 PROCEDURE — 80053 COMPREHEN METABOLIC PANEL: CPT

## 2019-09-13 PROCEDURE — 80061 LIPID PANEL: CPT

## 2019-09-20 ENCOUNTER — OFFICE VISIT (OUTPATIENT)
Dept: FAMILY MEDICINE CLINIC | Facility: CLINIC | Age: 59
End: 2019-09-20
Payer: COMMERCIAL

## 2019-09-20 VITALS
WEIGHT: 203 LBS | SYSTOLIC BLOOD PRESSURE: 126 MMHG | RESPIRATION RATE: 16 BRPM | DIASTOLIC BLOOD PRESSURE: 82 MMHG | HEIGHT: 65 IN | TEMPERATURE: 97.5 F | OXYGEN SATURATION: 95 % | BODY MASS INDEX: 33.82 KG/M2 | HEART RATE: 70 BPM

## 2019-09-20 DIAGNOSIS — Z12.11 SCREENING FOR COLON CANCER: ICD-10-CM

## 2019-09-20 DIAGNOSIS — K21.9 GASTROESOPHAGEAL REFLUX DISEASE WITHOUT ESOPHAGITIS: ICD-10-CM

## 2019-09-20 DIAGNOSIS — Z12.39 SCREENING FOR BREAST CANCER: Primary | ICD-10-CM

## 2019-09-20 DIAGNOSIS — I10 BENIGN ESSENTIAL HYPERTENSION: ICD-10-CM

## 2019-09-20 DIAGNOSIS — E78.49 FAMILIAL COMBINED HYPERLIPIDEMIA: ICD-10-CM

## 2019-09-20 DIAGNOSIS — I10 ESSENTIAL HYPERTENSION: ICD-10-CM

## 2019-09-20 DIAGNOSIS — Z12.11 SCREEN FOR COLON CANCER: ICD-10-CM

## 2019-09-20 PROCEDURE — 99214 OFFICE O/P EST MOD 30 MIN: CPT | Performed by: FAMILY MEDICINE

## 2019-09-20 PROCEDURE — 3074F SYST BP LT 130 MM HG: CPT | Performed by: FAMILY MEDICINE

## 2019-09-20 PROCEDURE — 3008F BODY MASS INDEX DOCD: CPT | Performed by: FAMILY MEDICINE

## 2019-09-20 PROCEDURE — 3079F DIAST BP 80-89 MM HG: CPT | Performed by: FAMILY MEDICINE

## 2019-09-20 RX ORDER — OMEPRAZOLE 20 MG/1
20 CAPSULE, DELAYED RELEASE ORAL DAILY
Qty: 90 CAPSULE | Refills: 1 | Status: SHIPPED | OUTPATIENT
Start: 2019-09-20

## 2019-09-20 RX ORDER — LISINOPRIL AND HYDROCHLOROTHIAZIDE 12.5; 1 MG/1; MG/1
1 TABLET ORAL DAILY
Qty: 90 TABLET | Refills: 1 | Status: SHIPPED | OUTPATIENT
Start: 2019-09-20 | End: 2020-03-10

## 2019-09-20 NOTE — PROGRESS NOTES
[facility]      NAME: Luigi Ames  AGE: 62 y o  SEX: female  : 1960   MRN: [id number]    DATE: 2019  TIME: 12:17 PM    Assessment and Plan     Problem List Items Addressed This Visit     Esophageal reflux    Relevant Medications    omeprazole (PriLOSEC) 20 mg delayed release capsule    Familial combined hyperlipidemia    Relevant Orders    Comprehensive metabolic panel    Lipid Panel with Direct LDL reflex    TSH, 3rd generation      Other Visit Diagnoses     Screening for breast cancer    -  Primary    Relevant Orders    Mammo screening bilateral w 3d & cad    Screening for colon cancer        Screen for colon cancer        Relevant Orders    Ambulatory referral to Gastroenterology    Essential hypertension        Relevant Medications    lisinopril-hydrochlorothiazide (PRINZIDE,ZESTORETIC) 10-12 5 MG per tablet              Return to office in:  6 months    Chief Complaint     Chief Complaint   Patient presents with    Follow-up     6 months check up/BW         History of Present Illness     Routine follow-up for chronic medical problems and review recent fasting blood work  Patient is being treated for hypertension with lisinopril hydrochlorothiazide  She takes omeprazole for reflux  She has elevated cholesterol but does not take medication  She has been resistant to using statins  The following portions of the patient's history were reviewed and updated as appropriate: allergies, current medications, past family history, past medical history, past social history, past surgical history and problem list     Review of Systems   Review of Systems   Constitutional: Negative  Respiratory: Negative  Cardiovascular: Negative  Gastrointestinal: Negative  Genitourinary: Negative  Musculoskeletal: Negative  Psychiatric/Behavioral: Negative          Active Problem List     Patient Active Problem List   Diagnosis    Allergic rhinitis    Anemia    Benign essential hypertension    Elevated fasting glucose    Edema    Esophageal reflux    Familial combined hyperlipidemia    Vitamin D deficiency       Objective   /82   Pulse 70   Temp 97 5 °F (36 4 °C) (Tympanic)   Resp 16   Ht 5' 4 96" (1 65 m)   Wt 92 1 kg (203 lb)   SpO2 95%   BMI 33 82 kg/m²     Physical Exam   Constitutional: She is oriented to person, place, and time  She appears well-developed and well-nourished  No distress  HENT:   Head: Normocephalic and atraumatic  Eyes: Pupils are equal, round, and reactive to light  Conjunctivae are normal  Right eye exhibits no discharge  Neck: Normal range of motion  No thyromegaly present  Cardiovascular: Normal rate and regular rhythm  Pulmonary/Chest: Effort normal and breath sounds normal  No respiratory distress  Lymphadenopathy:     She has no cervical adenopathy  Neurological: She is alert and oriented to person, place, and time  Skin: Skin is warm and dry  She is not diaphoretic  Psychiatric: She has a normal mood and affect  Her behavior is normal  Judgment and thought content normal    Nursing note and vitals reviewed          Current Medications     Current Outpatient Medications:     CALCIUM PO, Take 2 tablets by mouth daily, Disp: , Rfl:     Cholecalciferol (VITAMIN D3) 1000 units CAPS, Take 1 capsule by mouth daily, Disp: , Rfl:     fluticasone (FLONASE) 50 mcg/act nasal spray, 2 sprays into each nostril daily, Disp: , Rfl:     furosemide (LASIX) 20 mg tablet, Take 1 tablet (20 mg total) by mouth daily, Disp: 30 tablet, Rfl: 0    lisinopril-hydrochlorothiazide (PRINZIDE,ZESTORETIC) 10-12 5 MG per tablet, Take 1 tablet by mouth daily, Disp: 90 tablet, Rfl: 1    multivitamin (THERAGRAN) TABS, Take 1 tablet by mouth daily, Disp: , Rfl:     Omega-3 Fatty Acids (FISH OIL) 1,000 mg, Take 1,000 mg by mouth daily, Disp: , Rfl:     omeprazole (PriLOSEC) 20 mg delayed release capsule, Take 1 capsule (20 mg total) by mouth daily, Disp: 90 capsule, Rfl: 1    Health Maintenance     Health Maintenance   Topic Date Due    Hepatitis C Screening  1960    CRC Screening: Colonoscopy  1960    Pneumococcal Vaccine: Pediatrics (0 to 5 Years) and At-Risk Patients (6 to 59 Years) (1 of 1 - PPSV23) 09/10/1964    BMI: Followup Plan  09/10/1976    HEPATITIS B VACCINES (1 of 3 - Risk 3-dose series) 09/10/1977    PAP SMEAR  09/10/1979    MAMMOGRAM  10/04/2019    INFLUENZA VACCINE  12/20/2019 (Originally 7/1/2019)    Depression Screening PHQ  12/20/2019    BMI: Adult  02/05/2020    Pneumococcal Vaccine: 65+ Years (1 of 2 - PCV13) 09/10/2023    DTaP,Tdap,and Td Vaccines (2 - Td) 01/22/2029     Immunization History   Administered Date(s) Administered    Tdap 01/22/2019       Court Cea, DO  [facility] Counsel[facility]: Body mass index is 33 82 kg/m²  The BMI is above normal  Nutrition recommendations include reducing portion sizes, moderation in carbohydrate intake and increasing intake of lean protein  Exercise recommendations include exercising 3-5 times per week

## 2019-09-20 NOTE — PATIENT INSTRUCTIONS

## 2019-09-20 NOTE — ASSESSMENT & PLAN NOTE
Patient using omeprazole every other day for reflux with Pepcid in between  Generally getting good control of symptoms  Will continue to see if she can taper omeprazole further

## 2019-09-20 NOTE — ASSESSMENT & PLAN NOTE
Cholesterol elevated with LDL greater than 150 and HDL of 50  Again discussed possibility of starting statins  Patient still resistant

## 2020-03-10 DIAGNOSIS — I10 ESSENTIAL HYPERTENSION: ICD-10-CM

## 2020-03-10 RX ORDER — LISINOPRIL AND HYDROCHLOROTHIAZIDE 12.5; 1 MG/1; MG/1
1 TABLET ORAL DAILY
Qty: 90 TABLET | Refills: 1 | Status: SHIPPED | OUTPATIENT
Start: 2020-03-10 | End: 2020-03-17 | Stop reason: SDUPTHER

## 2020-03-17 DIAGNOSIS — I10 ESSENTIAL HYPERTENSION: ICD-10-CM

## 2020-03-17 RX ORDER — FUROSEMIDE 20 MG/1
20 TABLET ORAL DAILY
Qty: 30 TABLET | Refills: 0 | Status: SHIPPED | OUTPATIENT
Start: 2020-03-17 | End: 2020-05-04

## 2020-03-17 RX ORDER — LISINOPRIL AND HYDROCHLOROTHIAZIDE 12.5; 1 MG/1; MG/1
1 TABLET ORAL DAILY
Qty: 90 TABLET | Refills: 0 | Status: SHIPPED | OUTPATIENT
Start: 2020-03-17 | End: 2020-04-04 | Stop reason: SDUPTHER

## 2020-04-04 DIAGNOSIS — I10 ESSENTIAL HYPERTENSION: ICD-10-CM

## 2020-04-04 RX ORDER — LISINOPRIL AND HYDROCHLOROTHIAZIDE 12.5; 1 MG/1; MG/1
1 TABLET ORAL DAILY
Qty: 90 TABLET | Refills: 0 | Status: SHIPPED | OUTPATIENT
Start: 2020-04-04 | End: 2020-04-09 | Stop reason: SDUPTHER

## 2020-04-09 DIAGNOSIS — I10 ESSENTIAL HYPERTENSION: ICD-10-CM

## 2020-04-09 RX ORDER — LISINOPRIL AND HYDROCHLOROTHIAZIDE 12.5; 1 MG/1; MG/1
1 TABLET ORAL DAILY
Qty: 90 TABLET | Refills: 3 | Status: SHIPPED | OUTPATIENT
Start: 2020-04-09 | End: 2020-04-13 | Stop reason: SDUPTHER

## 2020-04-13 ENCOUNTER — TELEPHONE (OUTPATIENT)
Dept: FAMILY MEDICINE CLINIC | Facility: CLINIC | Age: 60
End: 2020-04-13

## 2020-04-13 DIAGNOSIS — I10 ESSENTIAL HYPERTENSION: ICD-10-CM

## 2020-04-13 RX ORDER — LISINOPRIL AND HYDROCHLOROTHIAZIDE 12.5; 1 MG/1; MG/1
1 TABLET ORAL DAILY
Qty: 7 TABLET | Refills: 0 | Status: SHIPPED | OUTPATIENT
Start: 2020-04-13 | End: 2020-07-20 | Stop reason: SDUPTHER

## 2020-04-24 ENCOUNTER — TELEMEDICINE (OUTPATIENT)
Dept: FAMILY MEDICINE CLINIC | Facility: CLINIC | Age: 60
End: 2020-04-24

## 2020-04-24 DIAGNOSIS — R73.01 ELEVATED FASTING GLUCOSE: ICD-10-CM

## 2020-04-24 DIAGNOSIS — E78.49 FAMILIAL COMBINED HYPERLIPIDEMIA: ICD-10-CM

## 2020-04-24 DIAGNOSIS — I10 BENIGN ESSENTIAL HYPERTENSION: Primary | ICD-10-CM

## 2020-04-24 PROCEDURE — NC001 PR NO CHARGE: Performed by: FAMILY MEDICINE

## 2020-04-27 ENCOUNTER — TELEMEDICINE (OUTPATIENT)
Dept: FAMILY MEDICINE CLINIC | Facility: CLINIC | Age: 60
End: 2020-04-27
Payer: COMMERCIAL

## 2020-04-27 DIAGNOSIS — L20.9 ATOPIC DERMATITIS, UNSPECIFIED TYPE: Primary | ICD-10-CM

## 2020-04-27 PROCEDURE — 99214 OFFICE O/P EST MOD 30 MIN: CPT | Performed by: FAMILY MEDICINE

## 2020-04-27 RX ORDER — TRIAMCINOLONE ACETONIDE 5 MG/G
CREAM TOPICAL 2 TIMES DAILY
Qty: 60 G | Refills: 2 | Status: SHIPPED | OUTPATIENT
Start: 2020-04-27

## 2020-05-04 DIAGNOSIS — I10 ESSENTIAL HYPERTENSION: ICD-10-CM

## 2020-05-04 RX ORDER — FUROSEMIDE 20 MG/1
TABLET ORAL
Qty: 30 TABLET | Refills: 0 | Status: SHIPPED | OUTPATIENT
Start: 2020-05-04 | End: 2021-02-16 | Stop reason: SDUPTHER

## 2020-07-20 DIAGNOSIS — I10 ESSENTIAL HYPERTENSION: ICD-10-CM

## 2020-07-20 RX ORDER — LISINOPRIL AND HYDROCHLOROTHIAZIDE 12.5; 1 MG/1; MG/1
1 TABLET ORAL DAILY
Qty: 90 TABLET | Refills: 1 | Status: SHIPPED | OUTPATIENT
Start: 2020-07-20 | End: 2020-12-04 | Stop reason: SDUPTHER

## 2020-07-24 DIAGNOSIS — I10 ESSENTIAL HYPERTENSION: ICD-10-CM

## 2020-07-24 RX ORDER — LISINOPRIL AND HYDROCHLOROTHIAZIDE 12.5; 1 MG/1; MG/1
1 TABLET ORAL DAILY
Qty: 10 TABLET | Refills: 0 | OUTPATIENT
Start: 2020-07-24

## 2020-12-04 DIAGNOSIS — I10 ESSENTIAL HYPERTENSION: ICD-10-CM

## 2020-12-04 RX ORDER — LISINOPRIL AND HYDROCHLOROTHIAZIDE 12.5; 1 MG/1; MG/1
1 TABLET ORAL DAILY
Qty: 90 TABLET | Refills: 3 | Status: SHIPPED | OUTPATIENT
Start: 2020-12-04 | End: 2021-01-19 | Stop reason: SDUPTHER

## 2021-01-19 DIAGNOSIS — I10 ESSENTIAL HYPERTENSION: ICD-10-CM

## 2021-01-19 RX ORDER — LISINOPRIL AND HYDROCHLOROTHIAZIDE 12.5; 1 MG/1; MG/1
1 TABLET ORAL DAILY
Qty: 90 TABLET | Refills: 3 | Status: SHIPPED | OUTPATIENT
Start: 2021-01-19 | End: 2022-01-26

## 2021-02-16 DIAGNOSIS — I10 ESSENTIAL HYPERTENSION: ICD-10-CM

## 2021-02-16 RX ORDER — FUROSEMIDE 20 MG/1
20 TABLET ORAL DAILY
Qty: 30 TABLET | Refills: 2 | Status: SHIPPED | OUTPATIENT
Start: 2021-02-16 | End: 2022-03-11

## 2021-07-08 ENCOUNTER — TELEPHONE (OUTPATIENT)
Dept: FAMILY MEDICINE CLINIC | Facility: CLINIC | Age: 61
End: 2021-07-08

## 2021-07-08 NOTE — TELEPHONE ENCOUNTER
Pt called to f/u and confirm we want 3d mammogram order  Confirmed w/ pt 3d mamm ok per dense breast tissue  Also wanted to notify us she has had burning sensation in left breast on & off x1-2M

## 2021-07-15 ENCOUNTER — HOSPITAL ENCOUNTER (OUTPATIENT)
Dept: MAMMOGRAPHY | Facility: MEDICAL CENTER | Age: 61
Discharge: HOME | End: 2021-07-15
Payer: COMMERCIAL

## 2021-07-15 VITALS — WEIGHT: 193 LBS | BODY MASS INDEX: 32.15 KG/M2 | HEIGHT: 65 IN

## 2021-07-15 DIAGNOSIS — Z12.31 ENCOUNTER FOR SCREENING MAMMOGRAM FOR MALIGNANT NEOPLASM OF BREAST: ICD-10-CM

## 2021-07-15 DIAGNOSIS — Z12.39 SCREENING FOR BREAST CANCER: ICD-10-CM

## 2021-07-15 PROCEDURE — 77067 SCR MAMMO BI INCL CAD: CPT

## 2021-07-15 PROCEDURE — 77063 BREAST TOMOSYNTHESIS BI: CPT

## 2022-03-11 DIAGNOSIS — I10 ESSENTIAL HYPERTENSION: ICD-10-CM

## 2022-03-11 RX ORDER — FUROSEMIDE 20 MG/1
TABLET ORAL
Qty: 30 TABLET | Refills: 2 | Status: SHIPPED | OUTPATIENT
Start: 2022-03-11

## 2022-04-28 DIAGNOSIS — I10 ESSENTIAL HYPERTENSION: ICD-10-CM

## 2022-04-28 RX ORDER — LISINOPRIL AND HYDROCHLOROTHIAZIDE 12.5; 1 MG/1; MG/1
TABLET ORAL
Qty: 90 TABLET | Refills: 0 | Status: SHIPPED | OUTPATIENT
Start: 2022-04-28 | End: 2022-08-01

## 2022-07-31 DIAGNOSIS — I10 ESSENTIAL HYPERTENSION: ICD-10-CM

## 2022-08-01 RX ORDER — LISINOPRIL AND HYDROCHLOROTHIAZIDE 12.5; 1 MG/1; MG/1
TABLET ORAL
Qty: 90 TABLET | Refills: 0 | Status: SHIPPED | OUTPATIENT
Start: 2022-08-01 | End: 2022-08-08 | Stop reason: SDUPTHER

## 2022-08-08 DIAGNOSIS — I10 ESSENTIAL HYPERTENSION: ICD-10-CM

## 2022-08-09 RX ORDER — LISINOPRIL AND HYDROCHLOROTHIAZIDE 12.5; 1 MG/1; MG/1
1 TABLET ORAL DAILY
Qty: 30 TABLET | Refills: 0 | Status: SHIPPED | OUTPATIENT
Start: 2022-08-09 | End: 2022-09-06

## 2022-09-01 ENCOUNTER — APPOINTMENT (OUTPATIENT)
Dept: LAB | Facility: CLINIC | Age: 62
End: 2022-09-01
Payer: COMMERCIAL

## 2022-09-01 DIAGNOSIS — Z11.59 ENCOUNTER FOR HEPATITIS C SCREENING TEST FOR LOW RISK PATIENT: ICD-10-CM

## 2022-09-01 DIAGNOSIS — Z11.4 SCREENING FOR HIV (HUMAN IMMUNODEFICIENCY VIRUS): ICD-10-CM

## 2022-09-01 DIAGNOSIS — Z13.0 SCREENING FOR DEFICIENCY ANEMIA: ICD-10-CM

## 2022-09-01 DIAGNOSIS — E78.49 FAMILIAL COMBINED HYPERLIPIDEMIA: ICD-10-CM

## 2022-09-01 DIAGNOSIS — R73.01 ELEVATED FASTING GLUCOSE: ICD-10-CM

## 2022-09-01 LAB
ALBUMIN SERPL BCP-MCNC: 3.7 G/DL (ref 3.5–5)
ALP SERPL-CCNC: 58 U/L (ref 46–116)
ALT SERPL W P-5'-P-CCNC: 21 U/L (ref 12–78)
ANION GAP SERPL CALCULATED.3IONS-SCNC: 5 MMOL/L (ref 4–13)
AST SERPL W P-5'-P-CCNC: 15 U/L (ref 5–45)
BASOPHILS # BLD AUTO: 0.03 THOUSANDS/ΜL (ref 0–0.1)
BASOPHILS NFR BLD AUTO: 1 % (ref 0–1)
BILIRUB SERPL-MCNC: 0.62 MG/DL (ref 0.2–1)
BUN SERPL-MCNC: 21 MG/DL (ref 5–25)
CALCIUM SERPL-MCNC: 9.3 MG/DL (ref 8.3–10.1)
CHLORIDE SERPL-SCNC: 104 MMOL/L (ref 96–108)
CHOLEST SERPL-MCNC: 210 MG/DL
CO2 SERPL-SCNC: 27 MMOL/L (ref 21–32)
CREAT SERPL-MCNC: 1.08 MG/DL (ref 0.6–1.3)
EOSINOPHIL # BLD AUTO: 0.11 THOUSAND/ΜL (ref 0–0.61)
EOSINOPHIL NFR BLD AUTO: 3 % (ref 0–6)
ERYTHROCYTE [DISTWIDTH] IN BLOOD BY AUTOMATED COUNT: 13.2 % (ref 11.6–15.1)
EST. AVERAGE GLUCOSE BLD GHB EST-MCNC: 111 MG/DL
GFR SERPL CREATININE-BSD FRML MDRD: 54 ML/MIN/1.73SQ M
GLUCOSE P FAST SERPL-MCNC: 100 MG/DL (ref 65–99)
HBA1C MFR BLD: 5.5 %
HCT VFR BLD AUTO: 38.6 % (ref 34.8–46.1)
HCV AB SER QL: NORMAL
HDLC SERPL-MCNC: 57 MG/DL
HGB BLD-MCNC: 12.4 G/DL (ref 11.5–15.4)
IMM GRANULOCYTES # BLD AUTO: 0.01 THOUSAND/UL (ref 0–0.2)
IMM GRANULOCYTES NFR BLD AUTO: 0 % (ref 0–2)
LDLC SERPL CALC-MCNC: 141 MG/DL (ref 0–100)
LYMPHOCYTES # BLD AUTO: 1.46 THOUSANDS/ΜL (ref 0.6–4.47)
LYMPHOCYTES NFR BLD AUTO: 35 % (ref 14–44)
MCH RBC QN AUTO: 29.9 PG (ref 26.8–34.3)
MCHC RBC AUTO-ENTMCNC: 32.1 G/DL (ref 31.4–37.4)
MCV RBC AUTO: 93 FL (ref 82–98)
MONOCYTES # BLD AUTO: 0.38 THOUSAND/ΜL (ref 0.17–1.22)
MONOCYTES NFR BLD AUTO: 9 % (ref 4–12)
NEUTROPHILS # BLD AUTO: 2.15 THOUSANDS/ΜL (ref 1.85–7.62)
NEUTS SEG NFR BLD AUTO: 52 % (ref 43–75)
NRBC BLD AUTO-RTO: 0 /100 WBCS
PLATELET # BLD AUTO: 247 THOUSANDS/UL (ref 149–390)
PMV BLD AUTO: 10.9 FL (ref 8.9–12.7)
POTASSIUM SERPL-SCNC: 4.1 MMOL/L (ref 3.5–5.3)
PROT SERPL-MCNC: 7.6 G/DL (ref 6.4–8.4)
RBC # BLD AUTO: 4.15 MILLION/UL (ref 3.81–5.12)
SODIUM SERPL-SCNC: 136 MMOL/L (ref 135–147)
TRIGL SERPL-MCNC: 58 MG/DL
TSH SERPL DL<=0.05 MIU/L-ACNC: 0.93 UIU/ML (ref 0.45–4.5)
WBC # BLD AUTO: 4.14 THOUSAND/UL (ref 4.31–10.16)

## 2022-09-01 PROCEDURE — 87389 HIV-1 AG W/HIV-1&-2 AB AG IA: CPT

## 2022-09-01 PROCEDURE — 84443 ASSAY THYROID STIM HORMONE: CPT

## 2022-09-01 PROCEDURE — 36415 COLL VENOUS BLD VENIPUNCTURE: CPT

## 2022-09-01 PROCEDURE — 80061 LIPID PANEL: CPT

## 2022-09-01 PROCEDURE — 83036 HEMOGLOBIN GLYCOSYLATED A1C: CPT

## 2022-09-01 PROCEDURE — 85025 COMPLETE CBC W/AUTO DIFF WBC: CPT

## 2022-09-01 PROCEDURE — 86803 HEPATITIS C AB TEST: CPT

## 2022-09-01 PROCEDURE — 80053 COMPREHEN METABOLIC PANEL: CPT

## 2022-09-02 LAB — HIV 1+2 AB+HIV1 P24 AG SERPL QL IA: NORMAL

## 2022-09-06 DIAGNOSIS — I10 ESSENTIAL HYPERTENSION: ICD-10-CM

## 2022-09-06 RX ORDER — LISINOPRIL AND HYDROCHLOROTHIAZIDE 12.5; 1 MG/1; MG/1
TABLET ORAL
Qty: 30 TABLET | Refills: 0 | Status: SHIPPED | OUTPATIENT
Start: 2022-09-06 | End: 2022-09-07 | Stop reason: SDUPTHER

## 2022-09-07 ENCOUNTER — OFFICE VISIT (OUTPATIENT)
Dept: FAMILY MEDICINE CLINIC | Facility: CLINIC | Age: 62
End: 2022-09-07
Payer: COMMERCIAL

## 2022-09-07 VITALS
TEMPERATURE: 98.8 F | DIASTOLIC BLOOD PRESSURE: 86 MMHG | SYSTOLIC BLOOD PRESSURE: 124 MMHG | HEART RATE: 85 BPM | BODY MASS INDEX: 33.22 KG/M2 | HEIGHT: 65 IN | WEIGHT: 199.4 LBS | OXYGEN SATURATION: 99 %

## 2022-09-07 DIAGNOSIS — R73.01 ELEVATED FASTING GLUCOSE: ICD-10-CM

## 2022-09-07 DIAGNOSIS — E78.49 FAMILIAL COMBINED HYPERLIPIDEMIA: ICD-10-CM

## 2022-09-07 DIAGNOSIS — D64.9 ANEMIA, UNSPECIFIED TYPE: ICD-10-CM

## 2022-09-07 DIAGNOSIS — L20.9 ATOPIC DERMATITIS, UNSPECIFIED TYPE: ICD-10-CM

## 2022-09-07 DIAGNOSIS — K21.9 GASTROESOPHAGEAL REFLUX DISEASE WITHOUT ESOPHAGITIS: ICD-10-CM

## 2022-09-07 DIAGNOSIS — Z00.00 ANNUAL PHYSICAL EXAM: ICD-10-CM

## 2022-09-07 DIAGNOSIS — I10 BENIGN ESSENTIAL HYPERTENSION: ICD-10-CM

## 2022-09-07 DIAGNOSIS — Z12.31 ENCOUNTER FOR SCREENING MAMMOGRAM FOR MALIGNANT NEOPLASM OF BREAST: ICD-10-CM

## 2022-09-07 DIAGNOSIS — I10 ESSENTIAL HYPERTENSION: ICD-10-CM

## 2022-09-07 DIAGNOSIS — Z12.11 ENCOUNTER FOR SCREENING COLONOSCOPY: Primary | ICD-10-CM

## 2022-09-07 PROCEDURE — 99396 PREV VISIT EST AGE 40-64: CPT | Performed by: FAMILY MEDICINE

## 2022-09-07 PROCEDURE — 3725F SCREEN DEPRESSION PERFORMED: CPT | Performed by: FAMILY MEDICINE

## 2022-09-07 RX ORDER — LISINOPRIL AND HYDROCHLOROTHIAZIDE 12.5; 1 MG/1; MG/1
1 TABLET ORAL DAILY
Qty: 90 TABLET | Refills: 3 | Status: SHIPPED | OUTPATIENT
Start: 2022-09-07

## 2022-09-07 RX ORDER — FAMOTIDINE 10 MG
10 TABLET ORAL AS NEEDED
COMMUNITY

## 2022-09-07 RX ORDER — FUROSEMIDE 20 MG/1
20 TABLET ORAL DAILY
Qty: 30 TABLET | Refills: 2 | Status: SHIPPED | OUTPATIENT
Start: 2022-09-07

## 2022-09-07 RX ORDER — TRIAMCINOLONE ACETONIDE 5 MG/G
CREAM TOPICAL 2 TIMES DAILY
Qty: 60 G | Refills: 2 | Status: SHIPPED | OUTPATIENT
Start: 2022-09-07

## 2022-09-07 NOTE — PATIENT INSTRUCTIONS
Wellness Visit for Adults   AMBULATORY CARE:   A wellness visit  is when you see your healthcare provider to get screened for health problems  Your healthcare provider will also give you advice on how to stay healthy  Write down your questions so you remember to ask them  Ask your healthcare provider how often you should have a wellness visit  What happens at a wellness visit:  Your healthcare provider will ask about your health, and your family history of health problems  This includes high blood pressure, heart disease, and cancer  He or she will ask if you have symptoms that concern you, if you smoke, and about your mood  You may also be asked about your intake of medicines, supplements, food, and alcohol  Any of the following may be done:  · Your weight  will be checked  Your height may also be checked so your body mass index (BMI) can be calculated  Your BMI shows if you are at a healthy weight  · Your blood pressure  and heart rate will be checked  Your temperature may also be checked  · Blood and urine tests  may be done  Blood tests may be done to check your cholesterol levels  Abnormal cholesterol levels increase your risk for heart disease and stroke  You may also need a blood or urine test to check for diabetes if you are at increased risk  Urine tests may be done to look for signs of an infection or kidney disease  · A physical exam  includes checking your heartbeat and lungs with a stethoscope  Your healthcare provider may also check your skin to look for sun damage  · Screening tests  may be recommended  A screening test is done to check for diseases that may not cause symptoms  The screening tests you may need depend on your age, gender, family history, and lifestyle habits  For example, colorectal screening may be recommended if you are 48years old or older  Screening tests you need if you are a woman:   · A Pap smear  is used to screen for cervical cancer   Pap smears are usually done every 3 to 5 years depending on your age  You may need them more often if you have had abnormal Pap smear test results in the past  Ask your healthcare provider how often you should have a Pap smear  · A mammogram  is an x-ray of your breasts to screen for breast cancer  Experts recommend mammograms every 2 years starting at age 48 years  You may need a mammogram at age 52 years or younger if you have an increased risk for breast cancer  Talk to your healthcare provider about when you should start having mammograms and how often you need them  Vaccines you may need:   · Get an influenza vaccine  every year  The influenza vaccine protects you from the flu  Several types of viruses cause the flu  The viruses change over time, so new vaccines are made each year  · Get a tetanus-diphtheria (Td) booster vaccine  every 10 years  This vaccine protects you against tetanus and diphtheria  Tetanus is a severe infection that may cause painful muscle spasms and lockjaw  Diphtheria is a severe bacterial infection that causes a thick covering in the back of your mouth and throat  · Get a human papillomavirus (HPV) vaccine  if you are female and aged 23 to 32 or male 23 to 24 and never received it  This vaccine protects you from HPV infection  HPV is the most common infection spread by sexual contact  HPV may also cause vaginal, penile, and anal cancers  · Get a pneumococcal vaccine  if you are aged 72 years or older  The pneumococcal vaccine is an injection given to protect you from pneumococcal disease  Pneumococcal disease is an infection caused by pneumococcal bacteria  The infection may cause pneumonia, meningitis, or an ear infection  · Get a shingles vaccine  if you are 60 or older, even if you have had shingles before  The shingles vaccine is an injection to protect you from the varicella-zoster virus  This is the same virus that causes chickenpox   Shingles is a painful rash that develops in people who had chickenpox or have been exposed to the virus  How to eat healthy:  My Plate is a model for planning healthy meals  It shows the types and amounts of foods that should go on your plate  Fruits and vegetables make up about half of your plate, and grains and protein make up the other half  A serving of dairy is included on the side of your plate  The amount of calories and serving sizes you need depends on your age, gender, weight, and height  Examples of healthy foods are listed below:  · Eat a variety of vegetables  such as dark green, red, and orange vegetables  You can also include canned vegetables low in sodium (salt) and frozen vegetables without added butter or sauces  · Eat a variety of fresh fruits , canned fruit in 100% juice, frozen fruit, and dried fruit  · Include whole grains  At least half of the grains you eat should be whole grains  Examples include whole-wheat bread, wheat pasta, brown rice, and whole-grain cereals such as oatmeal     · Eat a variety of protein foods such as seafood (fish and shellfish), lean meat, and poultry without skin (turkey and chicken)  Examples of lean meats include pork leg, shoulder, or tenderloin, and beef round, sirloin, tenderloin, and extra lean ground beef  Other protein foods include eggs and egg substitutes, beans, peas, soy products, nuts, and seeds  · Choose low-fat dairy products such as skim or 1% milk or low-fat yogurt, cheese, and cottage cheese  · Limit unhealthy fats  such as butter, hard margarine, and shortening  Exercise:  Exercise at least 30 minutes per day on most days of the week  Some examples of exercise include walking, biking, dancing, and swimming  You can also fit in more physical activity by taking the stairs instead of the elevator or parking farther away from stores  Include muscle strengthening activities 2 days each week  Regular exercise provides many health benefits   It helps you manage your weight, and decreases your risk for type 2 diabetes, heart disease, stroke, and high blood pressure  Exercise can also help improve your mood  Ask your healthcare provider about the best exercise plan for you  General health and safety guidelines:   · Do not smoke  Nicotine and other chemicals in cigarettes and cigars can cause lung damage  Ask your healthcare provider for information if you currently smoke and need help to quit  E-cigarettes or smokeless tobacco still contain nicotine  Talk to your healthcare provider before you use these products  · Limit alcohol  A drink of alcohol is 12 ounces of beer, 5 ounces of wine, or 1½ ounces of liquor  · Lose weight, if needed  Being overweight increases your risk of certain health conditions  These include heart disease, high blood pressure, type 2 diabetes, and certain types of cancer  · Protect your skin  Do not sunbathe or use tanning beds  Use sunscreen with a SPF 15 or higher  Apply sunscreen at least 15 minutes before you go outside  Reapply sunscreen every 2 hours  Wear protective clothing, hats, and sunglasses when you are outside  · Drive safely  Always wear your seatbelt  Make sure everyone in your car wears a seatbelt  A seatbelt can save your life if you are in an accident  Do not use your cell phone when you are driving  This could distract you and cause an accident  Pull over if you need to make a call or send a text message  · Practice safe sex  Use latex condoms if are sexually active and have more than one partner  Your healthcare provider may recommend screening tests for sexually transmitted infections (STIs)  · Wear helmets, lifejackets, and protective gear  Always wear a helmet when you ride a bike or motorcycle, go skiing, or play sports that could cause a head injury  Wear protective equipment when you play sports  Wear a lifejacket when you are on a boat or doing water sports      © Copyright [employer] 2022 Information is for End User's use only and may not be sold, redistributed or otherwise used for commercial purposes  All illustrations and images included in CareNotes® are the copyrighted property of [employer]  or Mary Freeman  The above information is an  only  It is not intended as medical advice for individual conditions or treatments  Talk to your doctor, nurse or pharmacist before following any medical regimen to see if it is safe and effective for you  Weight Management   AMBULATORY CARE:   Why it is important to manage your weight:  Being overweight increases your risk of health conditions such as heart disease, high blood pressure, type 2 diabetes, and certain types of cancer  It can also increase your risk for osteoarthritis, sleep apnea, and other respiratory problems  Aim for a slow, steady weight loss  Even a small amount of weight loss can lower your risk of health problems  Risks of being overweight:  Extra weight can cause many health problems, including the following:  · Diabetes (high blood sugar level)    · High blood pressure or high cholesterol    · Heart disease    · Stroke    · Gallbladder or liver disease    · Cancer of the colon, breast, prostate, liver, or kidney    · Sleep apnea    · Arthritis or gout    Screening  is done to check for health conditions before you have signs or symptoms  If you are 28to 79years old, your blood sugar level may be checked every 3 years for signs of prediabetes or diabetes  Your healthcare provider will check your blood pressure at each visit  High blood pressure can lead to a stroke or other problems  Your provider may check for signs of heart disease, cancer, or other health problems  How to lose weight safely:  A safe and healthy way to lose weight is to eat fewer calories and get regular exercise  · You can lose up about 1 pound a week by decreasing the number of calories you eat by 500 calories each day   You can decrease calories by eating smaller portion sizes or by cutting out high-calorie foods  Read labels to find out how many calories are in the foods you eat  · You can also burn calories with exercise such as walking, swimming, or biking  You will be more likely to keep weight off if you make these changes part of your lifestyle  Exercise at least 30 minutes per day on most days of the week  You can also fit in more physical activity by taking the stairs instead of the elevator or parking farther away from stores  Ask your healthcare provider about the best exercise plan for you  Healthy meal plan for weight management:  A healthy meal plan includes a variety of foods, contains fewer calories, and helps you stay healthy  A healthy meal plan includes the following:     · Eat whole-grain foods more often  A healthy meal plan should contain fiber  Fiber is the part of grains, fruits, and vegetables that is not broken down by your body  Whole-grain foods are healthy and provide extra fiber in your diet  Some examples of whole-grain foods are whole-wheat breads and pastas, oatmeal, brown rice, and bulgur  · Eat a variety of vegetables every day  Include dark, leafy greens such as spinach, kale, mariah greens, and mustard greens  Eat yellow and orange vegetables such as carrots, sweet potatoes, and winter squash  · Eat a variety of fruits every day  Choose fresh or canned fruit (canned in its own juice or light syrup) instead of juice  Fruit juice has very little or no fiber  · Eat low-fat dairy foods  Drink fat-free (skim) milk or 1% milk  Eat fat-free yogurt and low-fat cottage cheese  Try low-fat cheeses such as mozzarella and other reduced-fat cheeses  · Choose meat and other protein foods that are low in fat  Choose beans or other legumes such as split peas or lentils  Choose fish, skinless poultry (chicken or turkey), or lean cuts of red meat (beef or pork)   Before you cook meat or poultry, cut off any visible fat  · Use less fat and oil  Try baking foods instead of frying them  Add less fat, such as margarine, sour cream, regular salad dressing and mayonnaise to foods  Eat fewer high-fat foods  Some examples of high-fat foods include french fries, doughnuts, ice cream, and cakes  · Eat fewer sweets  Limit foods and drinks that are high in sugar  This includes candy, cookies, regular soda, and sweetened drinks  Ways to decrease calories:   · Eat smaller portions  ? Use a small plate with smaller servings  ? Do not eat second helpings  ? When you eat at a restaurant, ask for a box and place half of your meal in the box before you eat  ? Share an entrée with someone else  · Replace high-calorie snacks with healthy, low-calorie snacks  ? Choose fresh fruit, vegetables, fat-free rice cakes, or air-popped popcorn instead of potato chips, nuts, or chocolate  ? Choose water or calorie-free drinks instead of soda or sweetened drinks  · Do not shop for groceries when you are hungry  You may be more likely to make unhealthy food choices  Take a grocery list of healthy foods and shop after you have eaten  · Eat regular meals  Do not skip meals  Skipping meals can lead to overeating later in the day  This can make it harder for you to lose weight  Eat a healthy snack in place of a meal if you do not have time to eat a regular meal  Talk with a dietitian to help you create a meal plan and schedule that is right for you  Other things to consider as you try to lose weight:   · Be aware of situations that may give you the urge to overeat, such as eating while watching television  Find ways to avoid these situations  For example, read a book, go for a walk, or do crafts  · Meet with a weight loss support group or friends who are also trying to lose weight  This may help you stay motivated to continue working on your weight loss goals      © Copyright [employer] 2022 Information is for End User's use only and may not be sold, redistributed or otherwise used for commercial purposes  All illustrations and images included in CareNotes® are the copyrighted property of [employer] M [employer] Inc  or Mary Freeman  The above information is an  only  It is not intended as medical advice for individual conditions or treatments  Talk to your doctor, nurse or pharmacist before following any medical regimen to see if it is safe and effective for you

## 2023-04-27 ENCOUNTER — OFFICE VISIT (OUTPATIENT)
Dept: FAMILY MEDICINE CLINIC | Facility: CLINIC | Age: 63
End: 2023-04-27

## 2023-04-27 VITALS
HEART RATE: 80 BPM | OXYGEN SATURATION: 94 % | HEIGHT: 65 IN | DIASTOLIC BLOOD PRESSURE: 70 MMHG | RESPIRATION RATE: 16 BRPM | SYSTOLIC BLOOD PRESSURE: 112 MMHG | WEIGHT: 196.2 LBS | TEMPERATURE: 97.9 F | BODY MASS INDEX: 32.69 KG/M2

## 2023-04-27 DIAGNOSIS — B07.0 PLANTAR WART: Primary | ICD-10-CM

## 2023-04-27 NOTE — PROGRESS NOTES
"[address]    ASSESSMENT AND PLAN     1  Plantar wart  Assessment & Plan:  Cryo therapy today  Advise OTC Salicylic acid PRN  F/U with further problems           SUBJECTIVE       Patient ID: Primitivo Dietrich is a 57 y o  female  Chief Complaint   Patient presents with   • Foot Problem     Pt states she would like the bottom of her feet checked she thinks she may have a wart       HISTORY OF PRESENT ILLNESS    Presents with possible wart  Needs tx to retutn to nail salon for ongoing pedicures           The following portions of the patient's history were reviewed and updated as appropriate: allergies, current medications, past family history, past medical history, past social history, past surgical history, and problem list     REVIEW OF SYSTEMS  Review of Systems   Musculoskeletal:        Possible plantar wart       OBJECTIVE      VITAL SIGNS  /70   Pulse 80   Temp 97 9 °F (36 6 °C) (Tympanic)   Resp 16   Ht 5' 5\" (1 651 m)   Wt 89 kg (196 lb 3 2 oz)   SpO2 94%   BMI 32 65 kg/m²     CURRENT MEDICATIONS    Current Outpatient Medications:   •  famotidine (PEPCID) 10 mg tablet, Take 10 mg by mouth as needed for heartburn, Disp: , Rfl:   •  furosemide (LASIX) 20 mg tablet, Take 1 tablet (20 mg total) by mouth daily, Disp: 30 tablet, Rfl: 2  •  lisinopril-hydrochlorothiazide (PRINZIDE,ZESTORETIC) 10-12 5 MG per tablet, Take 1 tablet by mouth daily, Disp: 90 tablet, Rfl: 3  •  multivitamin (THERAGRAN) TABS, Take 1 tablet by mouth daily, Disp: , Rfl:   •  Omega-3 Fatty Acids (FISH OIL) 1,000 mg, Take 1,000 mg by mouth daily, Disp: , Rfl:   •  triamcinolone (KENALOG) 0 5 % cream, Apply topically 2 (two) times a day, Disp: 60 g, Rfl: 2  •  CALCIUM PO, Take 2 tablets by mouth daily (Patient not taking: Reported on 9/7/2022), Disp: , Rfl:   •  Cholecalciferol (VITAMIN D3) 1000 units CAPS, Take 1 capsule by mouth daily (Patient not taking: Reported on 9/7/2022), Disp: , Rfl:   •  fluticasone (FLONASE) " "50 mcg/act nasal spray, 2 sprays into each nostril daily (Patient not taking: Reported on 9/7/2022), Disp: , Rfl:   •  omeprazole (PriLOSEC) 20 mg delayed release capsule, Take 1 capsule (20 mg total) by mouth daily (Patient not taking: Reported on 9/7/2022), Disp: 90 capsule, Rfl: 1        PHYSICAL EXAMINATION   Physical Exam  Vitals and nursing note reviewed  Constitutional:       General: She is not in acute distress  Appearance: Normal appearance  She is not ill-appearing  HENT:      Head: Normocephalic  Cardiovascular:      Pulses:           Dorsalis pedis pulses are 2+ on the right side and 2+ on the left side  Posterior tibial pulses are 2+ on the right side and 2+ on the left side  Pulmonary:      Effort: Pulmonary effort is normal  No respiratory distress  Musculoskeletal:        Feet:    Feet:      Right foot:      Skin integrity: Callus and dry skin present  Left foot:      Skin integrity: Callus and dry skin present  Comments: B/L feet with thick, hard, calloused skin plantar surfaces and b/l heels  Advise pumice stone, thick moisture cream  Discussed brand \"Working Feet\"  works well - apply liberally at night  Cover with cotton socks  Skin:     General: Skin is warm and dry  Neurological:      Mental Status: She is alert and oriented to person, place, and time  Psychiatric:         Attention and Perception: Attention normal          Mood and Affect: Mood normal          Behavior: Behavior normal            Lesion Destruction    Date/Time: 4/27/2023 5:20 PM  Performed by: SHANELLE Joe  Authorized by: SHANELLE Joe   Universal Protocol:  Consent: Verbal consent obtained    Consent given by: patient      Procedure Details - Lesion Destruction:     Number of Lesions:  1  Lesion 1:     Body area:  Lower extremity    Lower extremity location:  R foot    Initial size (mm):  0 2    Final defect size (mm):  0 2    Malignancy: benign lesion      " Destruction method: cryotherapy       Scraped before and after  Tolerated well  After care reviewed

## 2023-04-27 NOTE — LETTER
April 27, 2023     Patient: Abdifatah Zuluaga  YOB: 1960  Date of Visit: 4/27/2023      To Whom it May Concern:    Marcus Alvarez is under my professional care  Go Clark was seen in my office on 4/27/2023  Treated for plantar wart  Okay to have pedicure  If you have any questions or concerns, please don't hesitate to call           Sincerely,          SHANELLE Farley        CC: No Recipients

## 2023-09-11 ENCOUNTER — OFFICE VISIT (OUTPATIENT)
Dept: FAMILY MEDICINE CLINIC | Facility: CLINIC | Age: 63
End: 2023-09-11
Payer: COMMERCIAL

## 2023-09-11 VITALS
HEIGHT: 65 IN | SYSTOLIC BLOOD PRESSURE: 112 MMHG | WEIGHT: 199 LBS | RESPIRATION RATE: 16 BRPM | OXYGEN SATURATION: 97 % | HEART RATE: 84 BPM | DIASTOLIC BLOOD PRESSURE: 78 MMHG | BODY MASS INDEX: 33.15 KG/M2

## 2023-09-11 DIAGNOSIS — Z13.0 SCREENING FOR DEFICIENCY ANEMIA: ICD-10-CM

## 2023-09-11 DIAGNOSIS — Z12.31 ENCOUNTER FOR SCREENING MAMMOGRAM FOR BREAST CANCER: ICD-10-CM

## 2023-09-11 DIAGNOSIS — Z78.0 ASYMPTOMATIC POSTMENOPAUSAL STATE: ICD-10-CM

## 2023-09-11 DIAGNOSIS — E78.49 FAMILIAL COMBINED HYPERLIPIDEMIA: ICD-10-CM

## 2023-09-11 DIAGNOSIS — I10 ESSENTIAL HYPERTENSION: ICD-10-CM

## 2023-09-11 DIAGNOSIS — Z12.11 ENCOUNTER FOR SCREENING COLONOSCOPY: ICD-10-CM

## 2023-09-11 DIAGNOSIS — R73.01 ELEVATED FASTING GLUCOSE: Primary | ICD-10-CM

## 2023-09-11 DIAGNOSIS — Z00.00 ANNUAL PHYSICAL EXAM: ICD-10-CM

## 2023-09-11 PROCEDURE — 99397 PER PM REEVAL EST PAT 65+ YR: CPT | Performed by: FAMILY MEDICINE

## 2023-09-11 RX ORDER — LISINOPRIL AND HYDROCHLOROTHIAZIDE 12.5; 1 MG/1; MG/1
1 TABLET ORAL DAILY
Qty: 90 TABLET | Refills: 3 | Status: SHIPPED | OUTPATIENT
Start: 2023-09-11

## 2023-09-11 NOTE — PROGRESS NOTES
[id number]  [address] GROUP    NAME: Alex Blunt  AGE: 70 y.o. SEX: female  : 1960     DATE: 2023     Assessment and Plan:   Patient was seen for annual physical exam.  Has some complaint of reflux symptoms which are intermittent. Recommend trial of Pepcid. She is due for routine blood work which was ordered today. She is due for mammogram and colon screening and bone density screening all of which were ordered today. She declines any immunizations at this time. Problem List Items Addressed This Visit     Familial combined hyperlipidemia    Relevant Orders    Comprehensive metabolic panel    Lipid Panel with Direct LDL reflex    TSH, 3rd generation    Elevated fasting glucose - Primary    Relevant Orders    Hemoglobin A1C   Other Visit Diagnoses     Screening for deficiency anemia        Relevant Orders    CBC and differential    Asymptomatic postmenopausal state        Relevant Orders    DXA bone density spine hip and pelvis    Encounter for screening mammogram for breast cancer        Relevant Orders    Mammo screening bilateral w 3d & cad    Annual physical exam        Encounter for screening colonoscopy        Relevant Orders    Ambulatory Referral to Gastroenterology    Essential hypertension        Relevant Medications    lisinopril-hydrochlorothiazide (PRINZIDE,ZESTORETIC) 10-12.5 MG per tablet          Immunizations and preventive care screenings were discussed with patient today. Appropriate education was printed on patient's after visit summary. Counseling:  Alcohol/drug use: discussed moderation in alcohol intake, the recommendations for healthy alcohol use, and avoidance of illicit drug use. Dental Health: discussed importance of regular tooth brushing, flossing, and dental visits.   Injury prevention: discussed safety/seat belts, safety helmets, smoke detectors, carbon dioxide detectors, and smoking near bedding or upholstery. Exercise: the importance of regular exercise/physical activity was discussed. Recommend exercise 3-5 times per week for at least 30 minutes. BMI Counseling: Body mass index is 33.12 kg/m². The BMI is above normal. Nutrition recommendations include decreasing portion sizes, encouraging healthy choices of fruits and vegetables, consuming healthier snacks, moderation in carbohydrate intake and increasing intake of lean protein. Exercise recommendations include exercising 3-5 times per week. No pharmacotherapy was ordered. Rationale for BMI follow-up plan is due to patient being overweight or obese. Depression Screening and Follow-up Plan: Patient was screened for depression during today's encounter. They screened negative with a PHQ-2 score of 0. Return in about 1 year (around 9/11/2024) for Annual physical.     Chief Complaint:     Chief Complaint   Patient presents with   • Physical Exam     Patient was no able to get her blood work done. History of Present Illness:     Adult Annual Physical   Patient here for a comprehensive physical exam. The patient reports no problems. Diet and Physical Activity  Diet/Nutrition: low carb diet and consuming 3-5 servings of fruits/vegetables daily. Exercise: no formal exercise. Depression Screening  PHQ-2/9 Depression Screening    Little interest or pleasure in doing things: 0 - not at all  Feeling down, depressed, or hopeless: 0 - not at all  PHQ-2 Score: 0  PHQ-2 Interpretation: Negative depression screen       General Health  Sleep: gets 4-6 hours of sleep on average. Hearing: normal - bilateral.  Vision: goes for regular eye exams, most recent eye exam <1 year ago, wears glasses and wears contacts. Dental: regular dental visits. /GYN Health  Patient is: postmenopausal  Last menstrual period: n/a  Contraceptive method: n/a. Review of Systems:     Review of Systems   Constitutional: Negative. HENT: Negative. Negative for congestion, ear pain, hearing loss, nosebleeds, sore throat and trouble swallowing. Eyes: Negative. Respiratory: Negative for apnea, cough, chest tightness, shortness of breath and wheezing. Cardiovascular: Negative. Gastrointestinal: Negative for abdominal pain, blood in stool, constipation, diarrhea, nausea and vomiting. Occasional reflux   Endocrine: Negative. Genitourinary: Negative for difficulty urinating, dysuria, frequency, hematuria and urgency. Musculoskeletal: Negative for arthralgias, joint swelling and myalgias. Skin: Negative for rash. Neurological: Negative for dizziness, syncope, light-headedness, numbness and headaches. Hematological: Negative. Psychiatric/Behavioral: Negative for confusion, dysphoric mood and sleep disturbance. The patient is not nervous/anxious. Past Medical History:     Past Medical History:   Diagnosis Date   • Joint pain     [id number]  resolved   • Sebaceous cyst     [id number] resolved      Past Surgical History:     History reviewed. No pertinent surgical history.    Social History:     Social History     Socioeconomic History   • Marital status:      Spouse name: None   • Number of children: None   • Years of education: None   • Highest education level: None   Occupational History   • None   Tobacco Use   • Smoking status: Never   • Smokeless tobacco: Never   Substance and Sexual Activity   • Alcohol use: Yes     Comment: social drinker   • Drug use: No   • Sexual activity: None   Other Topics Concern   • None   Social History Narrative   • None     Social Determinants of Health     Financial Resource Strain: Not on file   Food Insecurity: Not on file   Transportation Needs: Not on file   Physical Activity: Not on file   Stress: Not on file   Social Connections: Not on file   Intimate Partner Violence: Not on file   Housing Stability: Not on file      Family History:     Family History   Problem Relation Age of Onset • Other Mother         allergic drug reaction   • Brain cancer Mother    • Other Son         allergic drug reaction   • Liver cancer Father    • Pulmonary embolism Father       Current Medications:     Current Outpatient Medications   Medication Sig Dispense Refill   • famotidine (PEPCID) 10 mg tablet Take 10 mg by mouth as needed for heartburn     • furosemide (LASIX) 20 mg tablet Take 1 tablet (20 mg total) by mouth daily 30 tablet 2   • lisinopril-hydrochlorothiazide (PRINZIDE,ZESTORETIC) 10-12.5 MG per tablet Take 1 tablet by mouth daily 90 tablet 3   • Omega-3 Fatty Acids (FISH OIL) 1,000 mg Take 1,000 mg by mouth daily     • triamcinolone (KENALOG) 0.5 % cream Apply topically 2 (two) times a day 60 g 2   • CALCIUM PO Take 2 tablets by mouth daily (Patient not taking: Reported on 9/7/2022)     • Cholecalciferol (VITAMIN D3) 1000 units CAPS Take 1 capsule by mouth daily (Patient not taking: Reported on 9/7/2022)     • fluticasone (FLONASE) 50 mcg/act nasal spray 2 sprays into each nostril daily (Patient not taking: Reported on 9/7/2022)     • multivitamin (THERAGRAN) TABS Take 1 tablet by mouth daily     • omeprazole (PriLOSEC) 20 mg delayed release capsule Take 1 capsule (20 mg total) by mouth daily (Patient not taking: Reported on 9/7/2022) 90 capsule 1     No current facility-administered medications for this visit. Allergies:     No Known Allergies   Physical Exam:     /78 (BP Location: Left arm, Patient Position: Sitting, Cuff Size: Standard)   Pulse 84   Resp 16   Ht 5' 5" (1.651 m)   Wt 90.3 kg (199 lb)   SpO2 97%   BMI 33.12 kg/m²     Physical Exam  Vitals and nursing note reviewed. Constitutional:       Appearance: She is well-developed. HENT:      Head: Normocephalic and atraumatic. Eyes:      Pupils: Pupils are equal, round, and reactive to light. Cardiovascular:      Rate and Rhythm: Normal rate and regular rhythm. Heart sounds: Normal heart sounds.    Pulmonary: Effort: Pulmonary effort is normal.      Breath sounds: Normal breath sounds. Abdominal:      General: Bowel sounds are normal.      Palpations: Abdomen is soft. Musculoskeletal:         General: Normal range of motion. Cervical back: Normal range of motion and neck supple. Skin:     General: Skin is warm and dry. Capillary Refill: Capillary refill takes less than 2 seconds. Neurological:      Mental Status: She is alert and oriented to person, place, and time. Psychiatric:         Behavior: Behavior normal.         Thought Content:  Thought content normal.         Judgment: Judgment normal.          Belen Kevin, DO  [address] [facility]

## 2023-09-17 DIAGNOSIS — I10 ESSENTIAL HYPERTENSION: ICD-10-CM

## 2023-09-18 RX ORDER — FUROSEMIDE 20 MG/1
20 TABLET ORAL DAILY
Qty: 30 TABLET | Refills: 2 | Status: SHIPPED | OUTPATIENT
Start: 2023-09-18

## 2023-10-16 DIAGNOSIS — I10 ESSENTIAL HYPERTENSION: ICD-10-CM

## 2023-10-16 RX ORDER — FUROSEMIDE 20 MG/1
20 TABLET ORAL DAILY
Qty: 90 TABLET | Refills: 1 | Status: SHIPPED | OUTPATIENT
Start: 2023-10-16

## 2024-10-09 DIAGNOSIS — I10 ESSENTIAL HYPERTENSION: ICD-10-CM

## 2024-10-10 RX ORDER — LISINOPRIL/HYDROCHLOROTHIAZIDE 10-12.5 MG
1 TABLET ORAL DAILY
Qty: 90 TABLET | Refills: 3 | Status: SHIPPED | OUTPATIENT
Start: 2024-10-10

## 2025-07-23 DIAGNOSIS — I10 ESSENTIAL HYPERTENSION: ICD-10-CM

## 2025-07-23 NOTE — TELEPHONE ENCOUNTER
Reason for call:   [x] Refill   [] Prior Auth  [x] Other: patient only wants 30 tablets - patient only takes as needed       Office:   [x] PCP/Provider - Basil Jorge, /[facility]   [] Specialty/Provider -     Medication:     furosemide (LASIX) 20 mg tablet       Dose/Frequency: : TAKE 1 TABLET BY MOUTH EVERY DAY     Quantity: 30    Pharmacy: [facility]/pharmacy #[id number] - [place], [place] - [address] [place] [address]     Local Pharmacy   Does the patient have enough for 3 days?   [] Yes   [x] No - Send as HP to POD

## 2025-07-24 RX ORDER — FUROSEMIDE 20 MG/1
20 TABLET ORAL DAILY
Qty: 10 TABLET | Refills: 0 | Status: SHIPPED | OUTPATIENT
Start: 2025-07-24

## 2025-07-24 NOTE — TELEPHONE ENCOUNTER
Call patient.  I sent ten furosemide.  She has not been seen for 2 years and she has not had blood work for 3 years.  We should not be prescribing diuretics for a patient when we do not know how her kidneys are functioning.  Please schedule a physical with Dr. Jorge(may be an AWV - she is 64)  Once that is scheduled, let me know and I will put blood work orders in her chart, so that she can do it prior to the visit.